# Patient Record
Sex: MALE | Race: BLACK OR AFRICAN AMERICAN | Employment: FULL TIME | URBAN - METROPOLITAN AREA
[De-identification: names, ages, dates, MRNs, and addresses within clinical notes are randomized per-mention and may not be internally consistent; named-entity substitution may affect disease eponyms.]

---

## 2017-05-21 ENCOUNTER — HOSPITAL ENCOUNTER (EMERGENCY)
Age: 42
Discharge: HOME | End: 2017-05-21
Attending: EMERGENCY MEDICINE
Payer: COMMERCIAL

## 2017-05-21 VITALS
HEIGHT: 73 IN | HEART RATE: 63 BPM | BODY MASS INDEX: 28.49 KG/M2 | RESPIRATION RATE: 16 BRPM | DIASTOLIC BLOOD PRESSURE: 93 MMHG | OXYGEN SATURATION: 99 % | WEIGHT: 215 LBS | TEMPERATURE: 97.9 F | SYSTOLIC BLOOD PRESSURE: 135 MMHG

## 2017-05-21 DIAGNOSIS — H01.001 BLEPHARITIS OF RIGHT UPPER EYELID, UNSPECIFIED TYPE: Primary | ICD-10-CM

## 2017-05-21 PROCEDURE — 99283 EMERGENCY DEPT VISIT LOW MDM: CPT

## 2017-05-21 PROCEDURE — 74011000250 HC RX REV CODE- 250: Performed by: PHYSICIAN ASSISTANT

## 2017-05-21 RX ORDER — PROPARACAINE HYDROCHLORIDE 5 MG/ML
1 SOLUTION/ DROPS OPHTHALMIC
Status: COMPLETED | OUTPATIENT
Start: 2017-05-21 | End: 2017-05-21

## 2017-05-21 RX ORDER — ERYTHROMYCIN 5 MG/G
OINTMENT OPHTHALMIC
Qty: 1 G | Refills: 0 | Status: SHIPPED | OUTPATIENT
Start: 2017-05-21 | End: 2017-05-28

## 2017-05-21 RX ADMIN — PROPARACAINE HYDROCHLORIDE 1 DROP: 5 SOLUTION/ DROPS OPHTHALMIC at 18:06

## 2017-05-21 RX ADMIN — FLUORESCEIN SODIUM 1 STRIP: 1 STRIP OPHTHALMIC at 18:06

## 2017-05-21 NOTE — DISCHARGE INSTRUCTIONS
Blepharitis: Care Instructions  Your Care Instructions    Blepharitis is an inflammation or infection of the eyelids. It causes dry, scaly crusts on the eyelids. It can also cause your eyes to itch, burn, and look red. This problem is more common in people who have rosacea, dandruff, skin allergies, or eczema. Home treatment can help you keep your eyes comfortable. Your doctor may also prescribe an ointment to put on your eyelids. Follow-up care is a key part of your treatment and safety. Be sure to make and go to all appointments, and call your doctor if you are having problems. It's also a good idea to know your test results and keep a list of the medicines you take. How can you care for yourself at home? · Wash your eyelids and eyebrows daily with baby shampoo. To wash your eyelids:  ¨ Place a very warm washcloth over your eyes for about a minute. This will help soften and loosen the crusts on your eyelashes. ¨ Put a few drops of baby shampoo on a warm washcloth. ¨ Gently wipe your eyelids. This helps remove any crust. It also cleans your eyelids. ¨ Rinse well with water. · Be safe with medicines. If your doctor prescribed medicine for you, use it exactly as directed. Call your doctor if you think you are having a problem with your medicine. When should you call for help? Call your doctor now or seek immediate medical care if:  · You have new vision problems. · Your eyes hurt. · Your eyelids bleed. Watch closely for changes in your health, and be sure to contact your doctor if:  · After 2 weeks of home treatment, your symptoms are not getting better. · Your eye turns red, gets very teary, or has discharge. Where can you learn more? Go to http://maria e-patric.info/. Enter W620 in the search box to learn more about \"Blepharitis: Care Instructions. \"  Current as of: May 23, 2016  Content Version: 11.2  © 3814-3257 [employer].  Care instructions adapted under license by [address] (which disclaims liability or warranty for this information). If you have questions about a medical condition or this instruction, always ask your healthcare professional. [address] any warranty or liability for your use of this information.

## 2017-05-21 NOTE — ED TRIAGE NOTES
Pt presents to the ED with right eye pain onset yesterday. Pt denies foreign body. Pt reports increasing right eye pain. Pt with noted small swelling to the upper lateral right eyelid. Pt denies visual disturbance, states painful to blink.

## 2017-05-21 NOTE — ED PROVIDER NOTES
Patient is a 38 y.o. male presenting with eye pain. The history is provided by the patient. Eye Pain    This is a new problem. The current episode started yesterday. The problem occurs constantly. The problem has been gradually worsening. The right eye is affected. The injury mechanism is unknown (Pt works in shipyard but wears safety goggles, no known FB or injury). The pain is at a severity of 8/10. There is no history of trauma to the eye. There is no known exposure to pink eye. He does not wear contacts. Associated symptoms include foreign body sensation and pain. Pertinent negatives include no numbness, no blurred vision, no decreased vision, no discharge, no double vision, no photophobia, no eye redness, no nausea, no vomiting, no tingling, no weakness, no itching, no fever, no blindness, no head injury and no dizziness. He has tried nothing for the symptoms. History reviewed. No pertinent past medical history. History reviewed. No pertinent surgical history. History reviewed. No pertinent family history. Social History     Social History    Marital status: SINGLE     Spouse name: N/A    Number of children: N/A    Years of education: N/A     Occupational History    Not on file. Social History Main Topics    Smoking status: Former Smoker    Smokeless tobacco: Not on file    Alcohol use Yes      Comment: Occasionally    Drug use: No    Sexual activity: Not on file     Other Topics Concern    Not on file     Social History Narrative         ALLERGIES: Shellfish derived    Review of Systems   Constitutional: Negative for chills and fever. HENT: Negative for ear pain, rhinorrhea and sore throat. Eyes: Positive for pain. Negative for blindness, blurred vision, double vision, photophobia, discharge, redness, itching and visual disturbance. Respiratory: Negative for cough and shortness of breath. Cardiovascular: Negative for chest pain.    Gastrointestinal: Negative for abdominal pain, constipation, diarrhea, nausea and vomiting. Genitourinary: Negative for dysuria. Musculoskeletal: Negative for neck pain and neck stiffness. Skin: Negative. Negative for itching. Neurological: Negative for dizziness, tingling, weakness, light-headedness, numbness and headaches. Psychiatric/Behavioral: Negative. Vitals:    05/21/17 1754   BP: (!) 135/93   Pulse: 63   Resp: 16   Temp: 97.9 °F (36.6 °C)   SpO2: 99%   Weight: 97.5 kg (215 lb)   Height: 6' 1\" (1.854 m)            Physical Exam   Constitutional: He is oriented to person, place, and time. He appears well-developed and well-nourished. No distress. HENT:   Head: Normocephalic and atraumatic. Right Ear: Tympanic membrane, external ear and ear canal normal.   Left Ear: Tympanic membrane, external ear and ear canal normal.   Nose: Nose normal.   Mouth/Throat: Oropharynx is clear and moist and mucous membranes are normal.   Eyes: Conjunctivae and EOM are normal. Pupils are equal, round, and reactive to light. Lids are everted and swept, no foreign bodies found. Right eye exhibits discharge. Right eye exhibits no chemosis, no exudate and no hordeolum. No foreign body present in the right eye. Left eye exhibits no chemosis, no discharge, no exudate and no hordeolum. No foreign body present in the left eye. Right conjunctiva is not injected. Right conjunctiva has no hemorrhage. Left conjunctiva is not injected. Left conjunctiva has no hemorrhage. Right upper eyelid erythematous, mildly swollen. (-) periorbital pain, conjunctival erythema, crepitus or pain with eye movement. (-) globe pain (-) anatomical abnormality noted (-) visual files deficit (-) FB, No fluorescein uptake. Neck: Normal range of motion. Cardiovascular: Normal rate, regular rhythm, normal heart sounds and intact distal pulses. Exam reveals no gallop and no friction rub. No murmur heard.   Pulmonary/Chest: Effort normal and breath sounds normal. No respiratory distress. He has no wheezes. He has no rales. Abdominal: Soft. Bowel sounds are normal. There is no tenderness. Musculoskeletal: Normal range of motion. Neurological: He is alert and oriented to person, place, and time. Skin: Skin is warm and dry. He is not diaphoretic. Psychiatric: He has a normal mood and affect. His behavior is normal. Judgment and thought content normal.   Nursing note and vitals reviewed. MDM  Number of Diagnoses or Management Options  Diagnosis management comments: DDx: conjunctivitis, hordeolum/stye, blepharitis, corneal abrasion, subcoj hemorrhage, herpes keratitis, eye injury, FB in eye, glaucoma, macular degeneration, periorbital/orbital cellulitis, eye injury, HA, migraine, sinusitis, meningitis, SAH, head injury, TIA, stroke, change in visual acuity, viral illness, scalp/skin etiology, malignancy     IMPRESSION AND MEDICAL DECISION MAKING:  Based upon the patient's presentation with noted HPI and PE, along with the work up done in the emergency department, I believe that the patient is having noted blepharitis. SPECIFIC PATIENT INSTRUCTIONS FROM THE PROVIDER WHO TREATED YOU IN THE ER TODAY:  Erythromycin eye ointment as prescribed. Apply half-inch ribbon to the inside of the lower eyelid of the affected eye 3-4 times a day for 7 days. Apply warm compresses to the eye 4 times a day for 5-10 minutes. Take Tylenol/Acetaminophen (every 4-6 hours) and/or Motrin/Ibuprofen/Advil (every 6-8 hours) or Naprosyn/Naproxyn/Aleve for fever or pain as needed. FOLLOW UP APPOINTMENT:  Your primary care provider or ophthalmologist or the one listed in these discharge instructions in the next week.     Return if any concerns or worsening of condition(s)         Amount and/or Complexity of Data Reviewed  Clinical lab tests: ordered and reviewed  Tests in the medicine section of CPT®: ordered and reviewed  Review and summarize past medical records: yes  Discuss the patient with other providers: yes  Independent visualization of images, tracings, or specimens: yes    Risk of Complications, Morbidity, and/or Mortality  Presenting problems: low  Diagnostic procedures: low  Management options: low    Patient Progress  Patient progress: stable    ED Course       Eye Stain    Date/Time: 5/21/2017 6:22 PM    Performed by: PA  Supervising provider: Dr. Carmencita Klein        Corneal abrasion was not present on eyelid eversion. Cornea is clear. Anterior chamber is clear. Patient tolerance: Patient tolerated the procedure well with no immediate complications  My total time at bedside, performing this procedure was 1-15 minutes. Diagnosis:   1. Blepharitis of right upper eyelid, unspecified type          Disposition: home    Follow-up Information     Follow up With Details Comments Contact Info    [facility] EMERGENCY DEPT  As needed, If symptoms worsen [address] [place] [address] [place] [address]    [address] Go in 2 days  [address] [facility]  [phone]          Patient's Medications   Start Taking    ERYTHROMYCIN (ILOTYCIN) OPHTHALMIC OINTMENT    Apply half-inch ribbon to the inside of the lower eyelid of the affected eye 3-4 times a day for 7 days.    Continue Taking    No medications on file   These Medications have changed    No medications on file   Stop Taking    No medications on file

## 2018-02-22 ENCOUNTER — HOSPITAL ENCOUNTER (EMERGENCY)
Age: 43
Discharge: HOME | End: 2018-02-22
Attending: EMERGENCY MEDICINE
Payer: COMMERCIAL

## 2018-02-22 VITALS
SYSTOLIC BLOOD PRESSURE: 138 MMHG | HEIGHT: 70 IN | DIASTOLIC BLOOD PRESSURE: 89 MMHG | HEART RATE: 84 BPM | WEIGHT: 210 LBS | RESPIRATION RATE: 20 BRPM | OXYGEN SATURATION: 97 % | TEMPERATURE: 98.4 F | BODY MASS INDEX: 30.06 KG/M2

## 2018-02-22 DIAGNOSIS — E11.9 DIABETES MELLITUS, NEW ONSET (HCC): Primary | ICD-10-CM

## 2018-02-22 LAB
ALBUMIN SERPL-MCNC: 4.4 G/DL (ref 3.4–5)
ALBUMIN/GLOB SERPL: 0.8 {RATIO} (ref 0.8–1.7)
ALP SERPL-CCNC: 132 U/L (ref 45–117)
ALT SERPL-CCNC: 25 U/L (ref 16–61)
ANION GAP SERPL CALC-SCNC: 10 MMOL/L (ref 3–18)
AST SERPL-CCNC: 14 U/L (ref 15–37)
BASOPHILS # BLD: 0 K/UL (ref 0–0.06)
BASOPHILS NFR BLD: 0 % (ref 0–2)
BILIRUB SERPL-MCNC: 0.6 MG/DL (ref 0.2–1)
BUN SERPL-MCNC: 14 MG/DL (ref 7–18)
BUN/CREAT SERPL: 10 (ref 12–20)
CALCIUM SERPL-MCNC: 10.3 MG/DL (ref 8.5–10.1)
CHLORIDE SERPL-SCNC: 92 MMOL/L (ref 100–108)
CO2 SERPL-SCNC: 30 MMOL/L (ref 21–32)
CREAT SERPL-MCNC: 1.43 MG/DL (ref 0.6–1.3)
DIFFERENTIAL METHOD BLD: NORMAL
EOSINOPHIL # BLD: 0.1 K/UL (ref 0–0.4)
EOSINOPHIL NFR BLD: 1 % (ref 0–5)
ERYTHROCYTE [DISTWIDTH] IN BLOOD BY AUTOMATED COUNT: 13.5 % (ref 11.6–14.5)
GLOBULIN SER CALC-MCNC: 5.4 G/DL (ref 2–4)
GLUCOSE BLD STRIP.AUTO-MCNC: 254 MG/DL (ref 70–110)
GLUCOSE BLD STRIP.AUTO-MCNC: 455 MG/DL (ref 70–110)
GLUCOSE SERPL-MCNC: 454 MG/DL (ref 74–99)
HCT VFR BLD AUTO: 42.9 % (ref 36–48)
HGB BLD-MCNC: 14.4 G/DL (ref 13–16)
LIPASE SERPL-CCNC: 89 U/L (ref 73–393)
LYMPHOCYTES # BLD: 1.8 K/UL (ref 0.9–3.6)
LYMPHOCYTES NFR BLD: 22 % (ref 21–52)
MCH RBC QN AUTO: 26.7 PG (ref 24–34)
MCHC RBC AUTO-ENTMCNC: 33.6 G/DL (ref 31–37)
MCV RBC AUTO: 79.4 FL (ref 74–97)
MONOCYTES # BLD: 0.7 K/UL (ref 0.05–1.2)
MONOCYTES NFR BLD: 8 % (ref 3–10)
NEUTS SEG # BLD: 5.8 K/UL (ref 1.8–8)
NEUTS SEG NFR BLD: 69 % (ref 40–73)
PLATELET # BLD AUTO: 216 K/UL (ref 135–420)
PMV BLD AUTO: 10.6 FL (ref 9.2–11.8)
POTASSIUM SERPL-SCNC: 4.4 MMOL/L (ref 3.5–5.5)
PROT SERPL-MCNC: 9.8 G/DL (ref 6.4–8.2)
RBC # BLD AUTO: 5.4 M/UL (ref 4.7–5.5)
SODIUM SERPL-SCNC: 132 MMOL/L (ref 136–145)
WBC # BLD AUTO: 8.4 K/UL (ref 4.6–13.2)

## 2018-02-22 PROCEDURE — 83690 ASSAY OF LIPASE: CPT | Performed by: EMERGENCY MEDICINE

## 2018-02-22 PROCEDURE — 96361 HYDRATE IV INFUSION ADD-ON: CPT

## 2018-02-22 PROCEDURE — 96374 THER/PROPH/DIAG INJ IV PUSH: CPT

## 2018-02-22 PROCEDURE — 99284 EMERGENCY DEPT VISIT MOD MDM: CPT

## 2018-02-22 PROCEDURE — 74011250636 HC RX REV CODE- 250/636: Performed by: EMERGENCY MEDICINE

## 2018-02-22 PROCEDURE — 74011636637 HC RX REV CODE- 636/637: Performed by: EMERGENCY MEDICINE

## 2018-02-22 PROCEDURE — 85025 COMPLETE CBC W/AUTO DIFF WBC: CPT | Performed by: EMERGENCY MEDICINE

## 2018-02-22 PROCEDURE — 82962 GLUCOSE BLOOD TEST: CPT

## 2018-02-22 PROCEDURE — 80053 COMPREHEN METABOLIC PANEL: CPT | Performed by: EMERGENCY MEDICINE

## 2018-02-22 RX ORDER — METFORMIN HYDROCHLORIDE 500 MG/1
500 TABLET ORAL 2 TIMES DAILY WITH MEALS
Qty: 60 TAB | Refills: 0 | Status: SHIPPED | OUTPATIENT
Start: 2018-02-22 | End: 2019-11-13 | Stop reason: SDUPTHER

## 2018-02-22 RX ADMIN — INSULIN HUMAN 12 UNITS: 100 INJECTION, SOLUTION PARENTERAL at 12:14

## 2018-02-22 RX ADMIN — SODIUM CHLORIDE 1000 ML: 900 INJECTION, SOLUTION INTRAVENOUS at 11:26

## 2018-02-22 NOTE — ED NOTES
Almeta Goodpasture is a 41 y.o. male that was discharged in stable condition. The patients diagnosis, condition and treatment were explained to  patient and aftercare instructions were given. The patient verbalized understanding. Patient armband removed and shredded.

## 2018-02-22 NOTE — ED PROVIDER NOTES
HPI Comments: 11:54 AM Olivia Ely is a 41 y.o. male with a h/o Smoking presents to the ED with c/o Hyperglycemia onset today. Pt stated he was never told that he had DM, and that he has been drinking a lot of water because he has been feeling dehydrated. Pt reported urinary frequency onset 1 month ago. Pt denied n/v/d, CP, SOB, fever, numbness, weakness, or cough. All other sx denied. No other complaints at this time. PCP-None      Patient is a 41 y.o. male presenting with hyperglycemia. High Blood Sugar    Associated symptoms include frequency. Pertinent negatives include no fever, no nausea, no chest pain and no back pain. History reviewed. No pertinent past medical history. History reviewed. No pertinent surgical history. History reviewed. No pertinent family history. Social History     Social History    Marital status: SINGLE     Spouse name: N/A    Number of children: N/A    Years of education: N/A     Occupational History    Not on file. Social History Main Topics    Smoking status: Former Smoker    Smokeless tobacco: Never Used    Alcohol use Yes      Comment: Occasionally    Drug use: No    Sexual activity: Not on file     Other Topics Concern    Not on file     Social History Narrative         ALLERGIES: Shellfish derived    Review of Systems   Constitutional: Negative for diaphoresis and fever. HENT: Negative for congestion. Respiratory: Negative for cough and shortness of breath. Cardiovascular: Negative for chest pain. Gastrointestinal: Negative for abdominal pain and nausea. Genitourinary: Positive for frequency. Negative for enuresis. Musculoskeletal: Negative for back pain. Skin: Negative for rash. Neurological: Negative for dizziness. All other systems reviewed and are negative.       Vitals:    02/22/18 1107 02/22/18 1230 02/22/18 1330   BP: (!) 135/97 141/86 138/89   Pulse: 84     Resp: 20     Temp: 98.4 °F (36.9 °C)     SpO2: 98% 98% 97%   Weight: 95.3 kg (210 lb)     Height: 5' 10\" (1.778 m)              Physical Exam   Constitutional: He is oriented to person, place, and time. He appears well-developed and well-nourished. No distress. HENT:   Head: Normocephalic and atraumatic. Eyes: No scleral icterus. Neck: No JVD present. Cardiovascular: Normal rate and regular rhythm. Pulmonary/Chest: Effort normal and breath sounds normal. No respiratory distress. Abdominal: Soft. There is no tenderness. Neurological: He is alert and oriented to person, place, and time. Skin: Skin is warm and dry. Psychiatric: He has a normal mood and affect. Nursing note and vitals reviewed. MDM  Number of Diagnoses or Management Options  Diabetes mellitus, new onset [facility]):   Diagnosis management comments: IMP: New onset DM w/ sxs of polyuria and polydipsia for a month. No PCP. Not in DKA. Glucose improved to 250 range after insulin and IVF. Long d/w pt re: diabetes education. Consult placed to diabetes educator and referral for f/u given. Will start on Metformin. Pt knows this will likely be inadequate to control blood sugar and that he may require insulin.          ED Course       Procedures    Vitals:  Patient Vitals for the past 12 hrs:   Temp Pulse Resp BP SpO2   02/22/18 1330 - - - 138/89 97 %   02/22/18 1230 - - - 141/86 98 %   02/22/18 1107 98.4 °F (36.9 °C) 84 20 (!) 135/97 98 %         Medications ordered:   Medications   sodium chloride 0.9 % bolus infusion 1,000 mL (0 mL IntraVENous IV Completed 2/22/18 1343)   insulin regular (NOVOLIN R, HUMULIN R) injection 12 Units (12 Units IntraVENous Given 2/22/18 1214)         Lab findings:  Recent Results (from the past 12 hour(s))   GLUCOSE, POC    Collection Time: 02/22/18 11:09 AM   Result Value Ref Range    Glucose (POC) 455 (HH) 70 - 110 mg/dL   CBC WITH AUTOMATED DIFF    Collection Time: 02/22/18 11:15 AM   Result Value Ref Range    WBC 8.4 4.6 - 13.2 K/uL    RBC 5.40 4.70 - 5.50 M/uL HGB 14.4 13.0 - 16.0 g/dL    HCT 42.9 36.0 - 48.0 %    MCV 79.4 74.0 - 97.0 FL    MCH 26.7 24.0 - 34.0 PG    MCHC 33.6 31.0 - 37.0 g/dL    RDW 13.5 11.6 - 14.5 %    PLATELET 823 678 - 295 K/uL    MPV 10.6 9.2 - 11.8 FL    NEUTROPHILS 69 40 - 73 %    LYMPHOCYTES 22 21 - 52 %    MONOCYTES 8 3 - 10 %    EOSINOPHILS 1 0 - 5 %    BASOPHILS 0 0 - 2 %    ABS. NEUTROPHILS 5.8 1.8 - 8.0 K/UL    ABS. LYMPHOCYTES 1.8 0.9 - 3.6 K/UL    ABS. MONOCYTES 0.7 0.05 - 1.2 K/UL    ABS. EOSINOPHILS 0.1 0.0 - 0.4 K/UL    ABS. BASOPHILS 0.0 0.0 - 0.06 K/UL    DF AUTOMATED     METABOLIC PANEL, COMPREHENSIVE    Collection Time: 02/22/18 11:15 AM   Result Value Ref Range    Sodium 132 (L) 136 - 145 mmol/L    Potassium 4.4 3.5 - 5.5 mmol/L    Chloride 92 (L) 100 - 108 mmol/L    CO2 30 21 - 32 mmol/L    Anion gap 10 3.0 - 18 mmol/L    Glucose 454 (HH) 74 - 99 mg/dL    BUN 14 7.0 - 18 MG/DL    Creatinine 1.43 (H) 0.6 - 1.3 MG/DL    BUN/Creatinine ratio 10 (L) 12 - 20      GFR est AA >60 >60 ml/min/1.73m2    GFR est non-AA 55 (L) >60 ml/min/1.73m2    Calcium 10.3 (H) 8.5 - 10.1 MG/DL    Bilirubin, total 0.6 0.2 - 1.0 MG/DL    ALT (SGPT) 25 16 - 61 U/L    AST (SGOT) 14 (L) 15 - 37 U/L    Alk. phosphatase 132 (H) 45 - 117 U/L    Protein, total 9.8 (H) 6.4 - 8.2 g/dL    Albumin 4.4 3.4 - 5.0 g/dL    Globulin 5.4 (H) 2.0 - 4.0 g/dL    A-G Ratio 0.8 0.8 - 1.7     LIPASE    Collection Time: 02/22/18 11:15 AM   Result Value Ref Range    Lipase 89 73 - 393 U/L   GLUCOSE, POC    Collection Time: 02/22/18  1:06 PM   Result Value Ref Range    Glucose (POC) 254 (H) 70 - 110 mg/dL           X-Ray, CT or other radiology findings or impressions:  No orders to display       Progress notes, Consult notes or additional Procedure notes:       Disposition:  Diagnosis:   1.  Di[facility]      1) Start Metformin 500 mg twice daily  2) This may not be adequate to control your blood sugar and close follow up is required  3) A consult was placed in the ED to the Diabetes Educator who should be contacting you to help assist with follow up. 4) Follow up with [facility] next available appointment  5) Return for medical emergencies as needed. Disposition: home    Follow-up Information     Follow up With Details Comments [facility] Schedule an appointment as soon as possible for a visit ASAP [place]    [address] [facility] EMERGENCY DEPT  As needed, If symptoms worsen [address]  [phone]           Patient's Medications   Start Taking    METFORMIN (GLUCOPHAGE) 500 MG TABLET    Take 1 Tab by mouth two (2) times daily (with meals). Continue Taking    No medications on file   These Medications have changed    No medications on file   Stop Taking    No medications on file         [facility] acting as a scribe for and in the presence of Sandy Joyner MD      February 22, 2018 at 1:56 PM       Provider Attestation:      I personally performed the services described in the documentation, reviewed the documentation, as recorded by the scribe in my presence, and it accurately and completely records my words and actions.  February 22, 2018 at 1:56 PM - Sandy Joyner MD

## 2018-02-22 NOTE — DISCHARGE INSTRUCTIONS

## 2018-02-22 NOTE — Clinical Note
1) Start Metformin 500 mg twice daily 2) This may not be adequate to control your blood sugar and close follow up.

## 2018-02-22 NOTE — ED NOTES
Pt  Given diabetic  Education  Book and  Information on class schedule, pt and family member present and  Discussed concerns

## 2018-02-22 NOTE — ED TRIAGE NOTES
C/o dry mouth, itchy throat, frequent urination, and eating more in past few weeks. Denies any hx of diabetes.

## 2019-02-05 ENCOUNTER — HOSPITAL ENCOUNTER (EMERGENCY)
Age: 44
Discharge: HOME | End: 2019-02-05
Attending: EMERGENCY MEDICINE
Payer: COMMERCIAL

## 2019-02-05 ENCOUNTER — APPOINTMENT (OUTPATIENT)
Dept: GENERAL RADIOLOGY | Age: 44
End: 2019-02-05
Attending: PHYSICIAN ASSISTANT
Payer: COMMERCIAL

## 2019-02-05 VITALS
OXYGEN SATURATION: 98 % | SYSTOLIC BLOOD PRESSURE: 125 MMHG | RESPIRATION RATE: 19 BRPM | WEIGHT: 216 LBS | HEART RATE: 91 BPM | DIASTOLIC BLOOD PRESSURE: 76 MMHG | TEMPERATURE: 99.2 F | HEIGHT: 71 IN | BODY MASS INDEX: 30.24 KG/M2

## 2019-02-05 DIAGNOSIS — R68.83 CHILLS: ICD-10-CM

## 2019-02-05 DIAGNOSIS — R05.9 COUGH: Primary | ICD-10-CM

## 2019-02-05 DIAGNOSIS — M54.6 ACUTE LEFT-SIDED THORACIC BACK PAIN: ICD-10-CM

## 2019-02-05 PROCEDURE — 99282 EMERGENCY DEPT VISIT SF MDM: CPT

## 2019-02-05 PROCEDURE — 71046 X-RAY EXAM CHEST 2 VIEWS: CPT

## 2019-02-05 RX ORDER — HYDROCODONE POLISTIREX AND CHLORPHENIRAMINE POLISTIREX 10; 8 MG/5ML; MG/5ML
5 SUSPENSION, EXTENDED RELEASE ORAL
Qty: 60 ML | Refills: 0 | Status: SHIPPED | OUTPATIENT
Start: 2019-02-05 | End: 2019-11-13

## 2019-02-05 RX ORDER — AZITHROMYCIN 250 MG/1
TABLET, FILM COATED ORAL
Qty: 6 TAB | Refills: 0 | Status: SHIPPED | OUTPATIENT
Start: 2019-02-05 | End: 2019-02-10

## 2019-02-05 RX ORDER — BENZONATATE 100 MG/1
100 CAPSULE ORAL
Qty: 30 CAP | Refills: 0 | Status: SHIPPED | OUTPATIENT
Start: 2019-02-05 | End: 2019-02-12

## 2019-02-05 RX ORDER — IBUPROFEN 600 MG/1
600 TABLET ORAL
Qty: 20 TAB | Refills: 0 | Status: SHIPPED | OUTPATIENT
Start: 2019-02-05 | End: 2019-11-13

## 2019-02-05 RX ORDER — ACETAMINOPHEN 325 MG/1
650 TABLET ORAL
Qty: 20 TAB | Refills: 0 | Status: SHIPPED | OUTPATIENT
Start: 2019-02-05 | End: 2019-11-13

## 2019-02-05 NOTE — LETTER
[address] [place] [address] [facility] EMER[facility] 
[address] [facility] [address] [phone] Work/School Note Date: 2/5/2019 To Whom It May concern: 
 
Taisha Muller was seen and treated today in the emergency room by the following provider(s): 
Attending Provider: Marlin Garibay MD 
Physician Assistant: Birdie Laird. Taisha Muller may return to work on 2/7/19 Sincerely, Birdie Wilkinson

## 2019-02-05 NOTE — ED TRIAGE NOTES
Productive cough with left lower back pain x 2 days. States when he starts coughing he gets \"really short of breath\" and his back \"feels like something is pulling\"

## 2019-02-06 NOTE — ED NOTES
7:34 PM 
02/05/19 Discharge instructions given to [facility] (name) with verbalization of understanding. Patient accompanied by spouse. Patient discharged with the following prescriptions tylenol, motrin, azithromycin, tessalon perles, tussionex. Patient discharged to home (destination). Sanam Kendall

## 2019-02-06 NOTE — DISCHARGE INSTRUCTIONS
Patient Education        Learning About Relief for Back Pain  What is back tension and strain? Back strain happens when you overstretch, or pull, a muscle in your back. You may hurt your back in an accident or when you exercise or lift something. Most back pain will get better with rest and time. You can take care of yourself at home to help your back heal.  What can you do first to relieve back pain? When you first feel back pain, try these steps:  · Walk. Take a short walk (10 to 20 minutes) on a level surface (no slopes, hills, or stairs) every 2 to 3 hours. Walk only distances you can manage without pain, especially leg pain. · Relax. Find a comfortable position for rest. Some people are comfortable on the floor or a medium-firm bed with a small pillow under their head and another under their knees. Some people prefer to lie on their side with a pillow between their knees. Don't stay in one position for too long. · Try heat or ice. Try using a heating pad on a low or medium setting, or take a warm shower, for 15 to 20 minutes every 2 to 3 hours. Or you can buy single-use heat wraps that last up to 8 hours. You can also try an ice pack for 10 to 15 minutes every 2 to 3 hours. You can use an ice pack or a bag of frozen vegetables wrapped in a thin towel. There is not strong evidence that either heat or ice will help, but you can try them to see if they help. You may also want to try switching between heat and cold. · Take pain medicine exactly as directed. ? If the doctor gave you a prescription medicine for pain, take it as prescribed. ? If you are not taking a prescription pain medicine, ask your doctor if you can take an over-the-counter medicine. What else can you do? · Stretch and exercise. Exercises that increase flexibility may relieve your pain and make it easier for your muscles to keep your spine in a good, neutral position. And don't forget to keep walking. · Do self-massage.  You can use self-massage to unwind after work or school or to energize yourself in the morning. You can easily massage your feet, hands, or neck. Self-massage works best if you are in comfortable clothes and are sitting or lying in a comfortable position. Use oil or lotion to massage bare skin. · Reduce stress. Back pain can lead to a vicious [place]: Distress about the pain tenses the muscles in your back, which in turn causes more pain. Learn how to relax your mind and your muscles to lower your stress. Where can you learn more? Go to http://maria eEat In Chefpatric.info/. Enter P749 in the search box to learn more about \"Learning About Relief for Back Pain. \"  Current as of: September 20, 2018  Content Version: 11.9  © 8037-7382 [employer]. Care instructions adapted under license by [employer] (which disclaims liability or warranty for this information). If you have questions about a medical condition or this instruction, always ask your healthcare professional. [place] [address] any warranty or liability for your use of this information. Patient Education        Cough: Care Instructions  Your Care Instructions    A cough is your body's response to something that bothers your throat or airways. Many things can cause a cough. You might cough because of a cold or the flu, bronchitis, or asthma. Smoking, postnasal drip, allergies, and stomach acid that backs up into your throat also can cause coughs. A cough is a symptom, not a disease. Most coughs stop when the cause, such as a cold, goes away. You can take a few steps at home to cough less and feel better. Follow-up care is a key part of your treatment and safety. Be sure to make and go to all appointments, and call your doctor if you are having problems. It's also a good idea to know your test results and keep a list of the medicines you take. How can you care for yourself at home?   · Drink lots of water and other fluids. This helps thin the mucus and soothes a dry or sore throat. Honey or lemon juice in hot water or tea may ease a dry cough. · Take cough medicine as directed by your doctor. · Prop up your head on pillows to help you breathe and ease a dry cough. · Try cough drops to soothe a dry or sore throat. Cough drops don't stop a cough. Medicine-flavored cough drops are no better than candy-flavored drops or hard candy. · Do not smoke. Avoid secondhand smoke. If you need help quitting, talk to your doctor about stop-smoking programs and medicines. These can increase your chances of quitting for good. When should you call for help? Call 911 anytime you think you may need emergency care. For example, call if:    · You have severe trouble breathing.    Call your doctor now or seek immediate medical care if:    · You cough up blood.     · You have new or worse trouble breathing.     · You have a new or higher fever.     · You have a new rash.    Watch closely for changes in your health, and be sure to contact your doctor if:    · You cough more deeply or more often, especially if you notice more mucus or a change in the color of your mucus.     · You have new symptoms, such as a sore throat, an earache, or sinus pain.     · You do not get better as expected. Where can you learn more? Go to http://maria e-patric.info/. Enter D279 in the search box to learn more about \"Cough: Care Instructions. \"  Current as of: September 5, 2018  Content Version: 11.9  © 0380-2271 [employer]. Care instructions adapted under license by [employer] (which disclaims liability or warranty for this information). If you have questions about a medical condition or this instruction, always ask your healthcare professional. [place] [address] any warranty or liability for your use of this information.

## 2019-02-06 NOTE — ED PROVIDER NOTES
EMERGENCY DEPARTMENT HISTORY AND PHYSICAL EXAM 
 
Date: 2/5/2019 Patient Name: Ede Lerma History of Presenting Illness Chief Complaint Patient presents with  Cough  Back Pain History Provided By: Patient Chief Complaint: Cough, back pain, chills Duration: 3 days Timing:  Gradual 
Location: Left upper back Quality: Dull Severity: 10 out of 10 Modifying Factors: Cough makes back pain worse Associated Symptoms: none Additional History (Context): Ede Lerma is a 41 y.o. male with a history of asthma and DM who presents with a 3 day history of left sided back pain, cough and chills. Reports he has been around a loved one with the same symptoms and \"It jumped onto me\". Reports he was at work and had to leave due to coughing. Has tried NSAIDS at home with relief. Reports no fevers, smoking and states he was never officially diagnosed with diabetes and has not had to use an inhaler in years. Denies myalgias, CP, pleurisy, n/v/c/d, sore throat, ear pain. Reports shortness of breath when \"I start to cough to much\", but states it resolved when the coughing resolved. PCP: None Current Outpatient Medications Medication Sig Dispense Refill  benzonatate (TESSALON PERLES) 100 mg capsule Take 1 Cap by mouth three (3) times daily as needed for Cough for up to 7 days. 30 Cap 0  chlorpheniramine-HYDROcodone (TUSSIONEX PENNKINETIC ER) 10-8 mg/5 mL suspension Take 5 mL by mouth every twelve (12) hours as needed for Cough. Max Daily Amount: 10 mL. 60 mL 0  
 azithromycin (ZITHROMAX Z-VICKI) 250 mg tablet Take two tablets by mouth on day one and then one tablet by mouth on days 2-5 6 Tab 0  ibuprofen (MOTRIN) 600 mg tablet Take 1 Tab by mouth every six (6) hours as needed for Pain. 20 Tab 0  
 acetaminophen (TYLENOL) 325 mg tablet Take 2 Tabs by mouth every four (4) hours as needed for Pain.  20 Tab 0  
  metFORMIN (GLUCOPHAGE) 500 mg tablet Take 1 Tab by mouth two (2) times daily (with meals). 60 Tab 0 Past History Past Medical History: 
Past Medical History:  
Diagnosis Date  Asthma  Diabetes ([address] Past Surgical History: 
History reviewed. No pertinent surgical history. Family History: 
History reviewed. No pertinent family history. Social History: 
Social History Tobacco Use  Smoking status: Former Smoker  Smokeless tobacco: Never Used Substance Use Topics  Alcohol use: Yes Comment: Occasionally  Drug use: No  
 
 
Allergies: Allergies Allergen Reactions  Shellfish Derived Hives Review of Systems Review of Systems Constitutional: Positive for chills. Negative for fever. HENT: Negative for congestion, rhinorrhea and sore throat. Respiratory: Positive for cough and shortness of breath. Cardiovascular: Negative for chest pain. Gastrointestinal: Negative for abdominal pain, blood in stool, constipation, diarrhea, nausea and vomiting. Genitourinary: Negative for dysuria, frequency and hematuria. Musculoskeletal: Negative for back pain and myalgias. Skin: Negative for rash and wound. Neurological: Negative for dizziness and headaches. All other systems reviewed and are negative. All Other Systems Negative Physical Exam  
 
Vitals:  
 02/05/19 1748 BP: 125/76 Pulse: 91  
Resp: 19 Temp: 99.2 °F (37.3 °C) SpO2: 98% Weight: 98 kg (216 lb) Height: 5' 11\" (1.803 m) Physical Exam  
Constitutional: He is oriented to person, place, and time. He appears well-developed and well-nourished. No distress. HENT:  
Head: Normocephalic and atraumatic.   
Right Ear: Tympanic membrane and ear canal normal.  
Left Ear: Tympanic membrane and ear canal normal.  
Nose: Nose normal.  
Mouth/Throat: Uvula is midline, oropharynx is clear and moist and mucous membranes are normal.  
Eyes: Conjunctivae are normal.  
 Neck: Normal range of motion. Neck supple. Cardiovascular: Normal rate, regular rhythm and normal heart sounds. Pulmonary/Chest: Effort normal and breath sounds normal. No respiratory distress. He has no decreased breath sounds. He has no wheezes. He has no rhonchi. He has no rales. He exhibits no tenderness. Abdominal: Soft. Bowel sounds are normal. He exhibits no distension. There is no tenderness. There is no rebound and no guarding. Musculoskeletal: Normal range of motion. He exhibits no edema or deformity. Neurological: He is alert and oriented to person, place, and time. Skin: Skin is warm and dry. He is not diaphoretic. Psychiatric: He has a normal mood and affect. His behavior is normal.  
Nursing note and vitals reviewed. Diagnostic Study Results Labs - No results found for this or any previous visit (from the past 12 hour(s)). Radiologic Studies -  
XR CHEST PA LAT    (Results Pending) CT Results  (Last 48 hours) None CXR Results  (Last 48 hours) None Medical Decision Making I am the first provider for this patient. I reviewed the vital signs, available nursing notes, past medical history, past surgical history, family history and social history. Vital Signs-Reviewed the patient's vital signs. Pulse Oximetry Analysis -  100 % RA Records Reviewed: Nursing Notes and Old Medical Records Procedures: None Procedures Provider Notes (Medical Decision Making):  
 
Differential Diagnosis:  influenza, mononucleosis, acute bronchitis, URI, streptococcal pharyngitis, pertussis, pneumonia, asthma exacerbation, allergic rhinitis Plan: will order chest xray. 7:27 PM 
Have shared reassuring chest xray with patient, will discharge home on short course of abx given patient concern, antitussives, and motrin and tylenol. Advised rest, hydration, and PCP follow up.  Patient agrees with the plan and management and states all questions have been thoroughly answered and there are no more remaining questions. MED RECONCILIATION: 
No current facility-administered medications for this encounter. Current Outpatient Medications Medication Sig  
 benzonatate (TESSALON PERLES) 100 mg capsule Take 1 Cap by mouth three (3) times daily as needed for Cough for up to 7 days.  chlorpheniramine-HYDROcodone (TUSSIONEX PENNKINETIC ER) 10-8 mg/5 mL suspension Take 5 mL by mouth every twelve (12) hours as needed for Cough. Max Daily Amount: 10 mL.  azithromycin (ZITHROMAX Z-VICKI) 250 mg tablet Take two tablets by mouth on day one and then one tablet by mouth on days 2-5  ibuprofen (MOTRIN) 600 mg tablet Take 1 Tab by mouth every six (6) hours as needed for Pain.  acetaminophen (TYLENOL) 325 mg tablet Take 2 Tabs by mouth every four (4) hours as needed for Pain.  metFORMIN (GLUCOPHAGE) 500 mg tablet Take 1 Tab by mouth two (2) times daily (with meals). Disposition: 
Home DISCHARGE NOTE:  
Pt has been reexamined. Patient has no new complaints, changes, or physical findings. Care plan outlined and precautions discussed. Results of workup were reviewed with the patient. All medications were reviewed with the patient. All of pt's questions and concerns were addressed. Patient was instructed and agrees to follow up with PCP, as well as to return to the ED upon further deterioration. Patient is ready to go home. Follow-up Information Follow up With Specialties Details Why Contact Info [id number] [facility] EMERGENCY DEPT Emergency Medicine   Zechariah Padilla [id number] [phone] [address]  In 2 days  [address] [place] [address] [address] [place] [address] 
[phone] Current Discharge Medication List  
  
START taking these medications  Details  
benzonatate (TESSALON PERLES) 100 mg capsule Take 1 Cap by mouth three (3) times daily as needed for Cough for up to 7 days. Qty: 30 Cap, Refills: 0 Associated Diagnoses: Cough  
  
chlorpheniramine-HYDROcodone (TUSSIONEX PENNKINETIC ER) 10-8 mg/5 mL suspension Take 5 mL by mouth every twelve (12) hours as needed for Cough. Max Daily Amount: 10 mL. Qty: 60 mL, Refills: 0 Associated Diagnoses: Cough  
  
azithromycin (ZITHROMAX Z-VICKI) 250 mg tablet Take two tablets by mouth on day one and then one tablet by mouth on days 2-5 Qty: 6 Tab, Refills: 0  
  
ibuprofen (MOTRIN) 600 mg tablet Take 1 Tab by mouth every six (6) hours as needed for Pain. Qty: 20 Tab, Refills: 0  
  
acetaminophen (TYLENOL) 325 mg tablet Take 2 Tabs by mouth every four (4) hours as needed for Pain. Qty: 20 Tab, Refills: 0 Diagnosis Clinical Impression: 1. Cough 2. Chills 3. Acute left-sided thoracic back pain

## 2019-11-13 ENCOUNTER — OFFICE VISIT (OUTPATIENT)
Dept: FAMILY MEDICINE CLINIC | Age: 44
End: 2019-11-13

## 2019-11-13 VITALS
BODY MASS INDEX: 28 KG/M2 | OXYGEN SATURATION: 98 % | WEIGHT: 200 LBS | DIASTOLIC BLOOD PRESSURE: 88 MMHG | TEMPERATURE: 97.6 F | HEIGHT: 71 IN | HEART RATE: 68 BPM | SYSTOLIC BLOOD PRESSURE: 120 MMHG | RESPIRATION RATE: 18 BRPM

## 2019-11-13 DIAGNOSIS — Z13.220 ENCOUNTER FOR LIPID SCREENING FOR CARDIOVASCULAR DISEASE: ICD-10-CM

## 2019-11-13 DIAGNOSIS — Z13.29 SCREENING FOR THYROID DISORDER: ICD-10-CM

## 2019-11-13 DIAGNOSIS — E11.65 TYPE 2 DIABETES MELLITUS WITH HYPERGLYCEMIA, WITHOUT LONG-TERM CURRENT USE OF INSULIN (HCC): ICD-10-CM

## 2019-11-13 DIAGNOSIS — Z13.6 ENCOUNTER FOR LIPID SCREENING FOR CARDIOVASCULAR DISEASE: ICD-10-CM

## 2019-11-13 DIAGNOSIS — E11.65 TYPE 2 DIABETES MELLITUS WITH HYPERGLYCEMIA, WITHOUT LONG-TERM CURRENT USE OF INSULIN (HCC): Primary | ICD-10-CM

## 2019-11-13 DIAGNOSIS — R73.09 SUGAR BLOOD LEVEL INCREASED: ICD-10-CM

## 2019-11-13 DIAGNOSIS — E66.3 OVERWEIGHT (BMI 25.0-29.9): ICD-10-CM

## 2019-11-13 PROBLEM — E11.9 DIABETES (HCC): Status: ACTIVE | Noted: 2019-11-13

## 2019-11-13 LAB — HBA1C MFR BLD HPLC: 7.7 %

## 2019-11-13 RX ORDER — METFORMIN HYDROCHLORIDE 500 MG/1
500 TABLET ORAL 2 TIMES DAILY WITH MEALS
Qty: 60 TAB | Refills: 0 | Status: SHIPPED | OUTPATIENT
Start: 2019-11-13 | End: 2021-05-05

## 2019-11-13 NOTE — PROGRESS NOTES
Veena Nichols presents today for   Chief Complaint   Patient presents with    Diabetes     Wants to get DM under control. Needed PCP  close to home       Is someone accompanying this pt? No    Is the patient using any DME equipment during OV? No    Depression Screening:  3 most recent PHQ Screens 11/13/2019   Little interest or pleasure in doing things Not at all   Feeling down, depressed, irritable, or hopeless Not at all   Total Score PHQ 2 0       Learning Assessment:  No flowsheet data found. Abuse Screening:  Abuse Screening Questionnaire 11/13/2019   Do you ever feel afraid of your partner? N   Are you in a relationship with someone who physically or mentally threatens you? N   Is it safe for you to go home? Y         Health Maintenance Due   Topic Date Due    DTaP/Tdap/Td series (1 - Tdap) 02/08/1998    Influenza Age 5 to Adult  08/01/2019   . Health Maintenance reviewed and discussed and ordered per Provider. Coordination of Care  1. Have you been to the ER, urgent care clinic since your last visit? Hospitalized since your last visit? No    2. Have you seen or consulted any other health care providers outside of the [address] [place] [address] since your last visit? Include any pap smears or colon screening. No      Advance Directive:  1. Do you have an advance directive in place? Patient Reply:No    2. If not, would you like material regarding how to put one in place?  Patient Reply: No

## 2019-11-13 NOTE — PROGRESS NOTES
HPI  Pt presents to establish care. Patient with a history of type 2 diabetes. Had been taking Metformin but stopped several months ago. Says that at times he has  blurred vision and sometimes he feels like he has to urinate more than he should. Said that symptoms used to be more severe but he lost weight and now this is not as severe. Aside from this is feeling well    Lizz Joyner that he needs to get [employer] paperwork filled out because he doesn't always feel up to going to work and feels that is related to his diabetes being out of control, and he is already in trouble for missing too many days. Past Medical History  Past Medical History:   Diagnosis Date    Asthma     Diabetes [facility])        Surgical History  No past surgical history on file. Medications  Current Outpatient Medications   Medication Sig Dispense Refill    metFORMIN (GLUCOPHAGE) 500 mg tablet Take 1 Tab by mouth two (2) times daily (with meals). 60 Tab 0       Allergies  Allergies   Allergen Reactions    Shellfish Derived Hives       Family History  No family history on file.     Social History  Social History     Socioeconomic History    Marital status: SINGLE     Spouse name: Not on file    Number of children: Not on file    Years of education: Not on file    Highest education level: Not on file   Occupational History    Not on file   Social Needs    Financial resource strain: Not on file    Food insecurity:     Worry: Not on file     Inability: Not on file    Transportation needs:     Medical: Not on file     Non-medical: Not on file   Tobacco Use    Smoking status: Former Smoker    Smokeless tobacco: Never Used   Substance and Sexual Activity    Alcohol use: Yes     Comment: Occasionally    Drug use: No    Sexual activity: Not on file   Lifestyle    Physical activity:     Days per week: Not on file     Minutes per session: Not on file    Stress: Not on file   Relationships    Social connections:     Talks on phone: Not on file     Gets together: Not on file     Attends Mandaen service: Not on file     Active member of club or organization: Not on file     Attends meetings of clubs or organizations: Not on file     Relationship status: Not on file    Intimate partner violence:     Fear of current or ex partner: Not on file     Emotionally abused: Not on file     Physically abused: Not on file     Forced sexual activity: Not on file   Other Topics Concern    Not on file   Social History Narrative    Not on file       Problem List  Patient Active Problem List   Diagnosis Code    Diabetes ([facility] [address] E11.9    Overweight (BMI 25.0-29. 9) E66.3    Sugar blood level increased R73.09    Encounter for lipid screening for cardiovascular disease Z13.220, Z13.6    Screening for thyroid disorder Z13.29       Review of Systems  Review of Systems   Constitutional: Negative. Eyes: Positive for blurred vision. Intermittently has blurred vision   Respiratory: Negative. Cardiovascular: Negative. Neurological: Positive for headaches. Occasional headaches       Vital Signs  Vitals:    11/13/19 1541   BP: 120/88   Pulse: 68   Resp: 18   Temp: 97.6 °F (36.4 °C)   TempSrc: Oral   SpO2: 98%   Weight: 200 lb (90.7 kg)   Height: 5' 11\" (1.803 m)   PainSc:   0 - No pain       Physical Exam  Physical Exam   Constitutional: He is oriented to person, place, and time. He appears well-developed and well-nourished. No distress. Neck: Normal range of motion. Neck supple. Cardiovascular: Normal rate, regular rhythm and normal heart sounds. Pulmonary/Chest: Effort normal. No respiratory distress. Abdominal: Soft. Bowel sounds are normal. He exhibits no distension. There is no tenderness. Neurological: He is alert and oriented to person, place, and time. Skin: Skin is warm and dry. Psychiatric: He has a normal mood and affect. Nursing note and vitals reviewed.       Diagnostics  Orders Placed This Encounter    CBC WITH AUTOMATED DIFF     Standing Status:   Future     Number of Occurrences:   1     Standing Expiration Date:   11/13/2020    LIPID PANEL     Standing Status:   Future     Number of Occurrences:   1     Standing Expiration Date:   21/11/3904    METABOLIC PANEL, COMPREHENSIVE     Standing Status:   Future     Number of Occurrences:   1     Standing Expiration Date:   11/13/2020    TSH 3RD GENERATION     Standing Status:   Future     Number of Occurrences:   1     Standing Expiration Date:   11/13/2020    MICROALBUMIN, UR, RAND W/ MICROALB/CREAT RATIO     Standing Status:   Future     Number of Occurrences:   1     Standing Expiration Date:   11/13/2020    AMB POC HEMOGLOBIN A1C    metFORMIN (GLUCOPHAGE) 500 mg tablet     Sig: Take 1 Tab by mouth two (2) times daily (with meals). Dispense:  60 Tab     Refill:  0       Results  Results for orders placed or performed in visit on 11/13/19   AMB POC HEMOGLOBIN A1C   Result Value Ref Range    Hemoglobin A1c (POC) 7.7 %     Assessment and Plan  Diagnoses and all orders for this visit:    1. Type 2 diabetes mellitus with hyperglycemia, without long-term current use of insulin (HCC)  -     CBC WITH AUTOMATED DIFF; Future  -     METABOLIC PANEL, COMPREHENSIVE; Future  -     MICROALBUMIN, UR, RAND W/ MICROALB/CREAT RATIO; Future  -     metFORMIN (GLUCOPHAGE) 500 mg tablet; Take 1 Tab by mouth two (2) times daily (with meals). Point-of-care A1c obtained in the office today and is 7.7. Discussed with patient that I would not be able to fill out [facility] paperwork at this time. my primary goal for him is to take his medication and get his diabetes under control so that he feels well enough to go to work. Patient is to restart metformin. Discussed with patient that he should take it once a day with breakfast for the first 2 weeks and then twice daily thereafter and that hopefully this will avoid any potential abdominal upset that metformin can cause.   Reviewed importance of adhering to diabetic diet. Encourage patient to check his blood sugar once a day at least 3 or 4 times a week. Reviewed that he can do this fasting or 2 hours after eating a large meal.    2. Sugar blood level increased  -     AMB POC HEMOGLOBIN A1C    3. Encounter for lipid screening for cardiovascular disease  -     LIPID PANEL; Future    4. Screening for thyroid disorder  -     TSH 3RD GENERATION; Future    5. Overweight (BMI 25.0-29. 9)  Discussed importance of selection of foods, portion control, increased physical activity and weight loss. Discussed that this all may help to improve control of his diabetes. Will follow-up at next appointment      After care summary printed and reviewed with patient. Plan reviewed with patient. Questions answered. Patient verbalized understanding of plan and is in agreement with plan. Patient to follow up in two weeks or earlier if symptoms worsen. Encouraged the use of my chart.     SHAW HeltonC

## 2019-11-14 RX ORDER — BLOOD-GLUCOSE METER
EACH MISCELLANEOUS
Qty: 1 EACH | Refills: 0 | Status: SHIPPED | OUTPATIENT
Start: 2019-11-14 | End: 2021-05-05

## 2019-11-14 RX ORDER — LANCING DEVICE/LANCETS
KIT MISCELLANEOUS
Qty: 1 KIT | Refills: 0 | Status: SHIPPED | OUTPATIENT
Start: 2019-11-14 | End: 2021-05-05

## 2019-12-13 PROBLEM — Z13.220 ENCOUNTER FOR LIPID SCREENING FOR CARDIOVASCULAR DISEASE: Status: RESOLVED | Noted: 2019-11-13 | Resolved: 2019-12-13

## 2019-12-13 PROBLEM — Z13.6 ENCOUNTER FOR LIPID SCREENING FOR CARDIOVASCULAR DISEASE: Status: RESOLVED | Noted: 2019-11-13 | Resolved: 2019-12-13

## 2019-12-13 PROBLEM — Z13.29 SCREENING FOR THYROID DISORDER: Status: RESOLVED | Noted: 2019-11-13 | Resolved: 2019-12-13

## 2020-10-07 ENCOUNTER — HOSPITAL ENCOUNTER (EMERGENCY)
Age: 45
Discharge: HOME | End: 2020-10-07
Attending: EMERGENCY MEDICINE
Payer: COMMERCIAL

## 2020-10-07 ENCOUNTER — APPOINTMENT (OUTPATIENT)
Dept: GENERAL RADIOLOGY | Age: 45
End: 2020-10-07
Attending: EMERGENCY MEDICINE
Payer: COMMERCIAL

## 2020-10-07 VITALS
WEIGHT: 182 LBS | OXYGEN SATURATION: 99 % | DIASTOLIC BLOOD PRESSURE: 84 MMHG | HEIGHT: 69 IN | HEART RATE: 83 BPM | RESPIRATION RATE: 28 BRPM | BODY MASS INDEX: 26.96 KG/M2 | TEMPERATURE: 99.2 F | SYSTOLIC BLOOD PRESSURE: 133 MMHG

## 2020-10-07 DIAGNOSIS — R07.89 ATYPICAL CHEST PAIN: Primary | ICD-10-CM

## 2020-10-07 LAB
ANION GAP SERPL CALC-SCNC: 5 MMOL/L (ref 3–18)
ATRIAL RATE: 79 BPM
BASOPHILS # BLD: 0 K/UL (ref 0–0.1)
BASOPHILS NFR BLD: 0 % (ref 0–2)
BUN SERPL-MCNC: 10 MG/DL (ref 7–18)
BUN/CREAT SERPL: 8 (ref 12–20)
CALCIUM SERPL-MCNC: 9 MG/DL (ref 8.5–10.1)
CALCULATED P AXIS, ECG09: 72 DEGREES
CALCULATED R AXIS, ECG10: 21 DEGREES
CALCULATED T AXIS, ECG11: 26 DEGREES
CHLORIDE SERPL-SCNC: 101 MMOL/L (ref 100–111)
CO2 SERPL-SCNC: 29 MMOL/L (ref 21–32)
CREAT SERPL-MCNC: 1.25 MG/DL (ref 0.6–1.3)
DIAGNOSIS, 93000: NORMAL
DIFFERENTIAL METHOD BLD: ABNORMAL
EOSINOPHIL # BLD: 0 K/UL (ref 0–0.4)
EOSINOPHIL NFR BLD: 0 % (ref 0–5)
ERYTHROCYTE [DISTWIDTH] IN BLOOD BY AUTOMATED COUNT: 14.1 % (ref 11.6–14.5)
GLUCOSE SERPL-MCNC: 282 MG/DL (ref 74–99)
HCT VFR BLD AUTO: 40.9 % (ref 36–48)
HGB BLD-MCNC: 13.9 G/DL (ref 13–16)
LYMPHOCYTES # BLD: 0.8 K/UL (ref 0.9–3.6)
LYMPHOCYTES NFR BLD: 20 % (ref 21–52)
MAGNESIUM SERPL-MCNC: 2 MG/DL (ref 1.6–2.6)
MCH RBC QN AUTO: 28.3 PG (ref 24–34)
MCHC RBC AUTO-ENTMCNC: 34 G/DL (ref 31–37)
MCV RBC AUTO: 83.1 FL (ref 74–97)
MONOCYTES # BLD: 0.7 K/UL (ref 0.05–1.2)
MONOCYTES NFR BLD: 17 % (ref 3–10)
NEUTS SEG # BLD: 2.7 K/UL (ref 1.8–8)
NEUTS SEG NFR BLD: 63 % (ref 40–73)
P-R INTERVAL, ECG05: 160 MS
PLATELET # BLD AUTO: 161 K/UL (ref 135–420)
PMV BLD AUTO: 10.7 FL (ref 9.2–11.8)
POTASSIUM SERPL-SCNC: 3.8 MMOL/L (ref 3.5–5.5)
Q-T INTERVAL, ECG07: 382 MS
QRS DURATION, ECG06: 90 MS
QTC CALCULATION (BEZET), ECG08: 438 MS
RBC # BLD AUTO: 4.92 M/UL (ref 4.7–5.5)
SODIUM SERPL-SCNC: 135 MMOL/L (ref 136–145)
TROPONIN I SERPL-MCNC: <0.02 NG/ML (ref 0–0.04)
VENTRICULAR RATE, ECG03: 79 BPM
WBC # BLD AUTO: 4.3 K/UL (ref 4.6–13.2)

## 2020-10-07 PROCEDURE — 84484 ASSAY OF TROPONIN QUANT: CPT

## 2020-10-07 PROCEDURE — 71046 X-RAY EXAM CHEST 2 VIEWS: CPT

## 2020-10-07 PROCEDURE — 93005 ELECTROCARDIOGRAM TRACING: CPT

## 2020-10-07 PROCEDURE — 83735 ASSAY OF MAGNESIUM: CPT

## 2020-10-07 PROCEDURE — 80048 BASIC METABOLIC PNL TOTAL CA: CPT

## 2020-10-07 PROCEDURE — 99284 EMERGENCY DEPT VISIT MOD MDM: CPT

## 2020-10-07 PROCEDURE — 85025 COMPLETE CBC W/AUTO DIFF WBC: CPT

## 2020-10-07 NOTE — ED TRIAGE NOTES
Patient presents to ER for C/O intermittent Chest pain/tightness that began several days ago and has not gone away. Patient C/O loss of balance this morning while walking to the restroom. Denies any N/V/D.

## 2020-10-07 NOTE — ED PROVIDER NOTES
HPI patient complains of episodic chest pains for the past several weeks. .  Another episode this morning when he woke up. He describes as a dull aching pain under his left breast and is nonradiating. He says the pain lasts for about 5 to 10 minutes and then resolves. At the present time, patient says the pain is gone and he is asymptomatic. He said he came in today to be evaluated because he has been having these episodic pains for quite some time and wanted to have it checked. He denies any shortness of breath. He denies abdominal pain or nausea vomiting. No other specifics given at this time. Past Medical History:   Diagnosis Date    Asthma     Diabetes ([facility] [address]        No past surgical history on file. No family history on file.     Social History     Socioeconomic History    Marital status: SINGLE     Spouse name: Not on file    Number of children: Not on file    Years of education: Not on file    Highest education level: Not on file   Occupational History    Not on file   Social Needs    Financial resource strain: Not on file    Food insecurity     Worry: Not on file     Inability: Not on file    Transportation needs     Medical: Not on file     Non-medical: Not on file   Tobacco Use    Smoking status: Former Smoker    Smokeless tobacco: Never Used   Substance and Sexual Activity    Alcohol use: Yes     Comment: Occasionally    Drug use: No    Sexual activity: Not on file   Lifestyle    Physical activity     Days per week: Not on file     Minutes per session: Not on file    Stress: Not on file   Relationships    Social connections     Talks on phone: Not on file     Gets together: Not on file     Attends Synagogue service: Not on file     Active member of club or organization: Not on file     Attends meetings of clubs or organizations: Not on file     Relationship status: Not on file    Intimate partner violence     Fear of current or ex partner: Not on file     Emotionally abused: Not on file     Physically abused: Not on file     Forced sexual activity: Not on file   Other Topics Concern    Not on file   Social History Narrative    Not on file         ALLERGIES: Shellfish derived    Review of Systems   Constitutional: Negative. HENT: Negative. Eyes: Negative. Respiratory: Negative. Cardiovascular: Positive for chest pain. Gastrointestinal: Negative. Genitourinary: Negative. Musculoskeletal: Negative. Neurological: Negative. Psychiatric/Behavioral: Negative. Vitals:    10/07/20 0649   BP: 131/80   Pulse: 80   Resp: 20   Temp: 99.2 °F (37.3 °C)   SpO2: 96%   Weight: 82.6 kg (182 lb)   Height: 5' 9\" (1.753 m)            Physical Exam  Vitals signs and nursing note reviewed. Constitutional:       Appearance: He is well-developed. HENT:      Head: Normocephalic and atraumatic. Eyes:      Pupils: Pupils are equal, round, and reactive to light. Neck:      Musculoskeletal: Neck supple. Cardiovascular:      Rate and Rhythm: Normal rate and regular rhythm. Pulmonary:      Effort: Pulmonary effort is normal.      Breath sounds: Normal breath sounds. Abdominal:      Palpations: Abdomen is soft. Musculoskeletal: Normal range of motion. Skin:     General: Skin is warm and dry. Neurological:      Mental Status: He is alert and oriented to person, place, and time. MDM  Number of Diagnoses or Management Options  Diagnosis management comments: EKG was read myself at 6:40 AM showing normal sinus rhythm at a rate of 79 with no acute changes    PA and lateral chest x-ray was interpreted myself as showing no acute changes. I reviewed the results of the ER evaluation with the patient and he understands and agrees with the disposition and follow-up plan.   Alicia Barriga MD 9:00 AM         Procedures

## 2020-10-07 NOTE — ED NOTES
Shila Olivera is a 39 y.o. male that was discharged in good condition. The patients diagnosis, condition and treatment were explained to  patient and aftercare instructions were given. The patient verbalized understanding. Patient armband removed and shredded.

## 2020-10-07 NOTE — DISCHARGE INSTRUCTIONS

## 2020-10-07 NOTE — LETTER
[address] [place] [address] [facility] 
[address] [facility] [address] [phone] Work/School Note Date: 10/7/2020 To Whom It May concern: 
 
Cuong Sterling was seen and treated today in the emergency room by the following provider(s): 
Attending Provider: Suleiman Salmeron MD.   
 
Cuong Sterling may return to work on October 8, 2020 Merritt Dey Sincerely, 
 
 
 
 
Krystle Wilks MD

## 2021-05-05 ENCOUNTER — APPOINTMENT (OUTPATIENT)
Dept: GENERAL RADIOLOGY | Age: 46
End: 2021-05-05
Attending: NURSE PRACTITIONER
Payer: COMMERCIAL

## 2021-05-05 ENCOUNTER — HOSPITAL ENCOUNTER (EMERGENCY)
Age: 46
Discharge: HOME | End: 2021-05-05
Attending: EMERGENCY MEDICINE
Payer: COMMERCIAL

## 2021-05-05 VITALS
RESPIRATION RATE: 16 BRPM | HEART RATE: 78 BPM | WEIGHT: 190 LBS | OXYGEN SATURATION: 100 % | TEMPERATURE: 98.5 F | SYSTOLIC BLOOD PRESSURE: 139 MMHG | DIASTOLIC BLOOD PRESSURE: 90 MMHG | BODY MASS INDEX: 27.2 KG/M2 | HEIGHT: 70 IN

## 2021-05-05 DIAGNOSIS — M25.512 PAIN IN JOINT OF LEFT SHOULDER: Primary | ICD-10-CM

## 2021-05-05 PROCEDURE — 73030 X-RAY EXAM OF SHOULDER: CPT

## 2021-05-05 PROCEDURE — 99283 EMERGENCY DEPT VISIT LOW MDM: CPT

## 2021-05-05 PROCEDURE — 74011250637 HC RX REV CODE- 250/637: Performed by: NURSE PRACTITIONER

## 2021-05-05 RX ORDER — NAPROXEN 250 MG/1
500 TABLET ORAL
Status: COMPLETED | OUTPATIENT
Start: 2021-05-05 | End: 2021-05-05

## 2021-05-05 RX ORDER — NAPROXEN 500 MG/1
500 TABLET ORAL
Qty: 20 TAB | Refills: 0 | Status: SHIPPED | OUTPATIENT
Start: 2021-05-05 | End: 2021-05-15

## 2021-05-05 RX ADMIN — NAPROXEN 500 MG: 250 TABLET ORAL at 13:19

## 2021-05-05 NOTE — ED PROVIDER NOTES
EMERGENCY DEPARTMENT HISTORY AND PHYSICAL EXAM    Date: 5/5/2021  Patient Name: Olga Quick    History of Presenting Illness     Chief Complaint   Patient presents with    Shoulder Pain       History Provided By: patient      Additional History (Context): Olga Quick is a 42-year-old male with past medical history significant for asthma and diabetes who presents to the ER with complaints of gradually worsening left lateral shoulder pain for 1 week. 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 He denies any injury, trauma, or fall. He is left-hand dominant. He denies any swelling, paresthesias, or extremity weakness. Has not tried anything at home. Laying on the affected side seems to make his pain better and internal rotation reaching across his body makes the pain worse. PCP: None        Past History     Past Medical History:  Past Medical History:   Diagnosis Date    Asthma     Diabetes ([facility] [address]        Past Surgical History:  No past surgical history on file. Family History:  History reviewed. No pertinent family history. Social History:  Social History     Tobacco Use    Smoking status: Former Smoker    Smokeless tobacco: Never Used   Substance Use Topics    Alcohol use: Yes     Comment: Occasionally    Drug use: No       Allergies: Allergies   Allergen Reactions    Shellfish Derived Hives         Review of Systems     Review of Systems   Constitutional: Negative for chills and fever. HENT: Negative for nasal congestion, sore throat, rhinorrhea  Eyes: Negative. Respiratory: Negative for cough and for shortness of breath. Cardiovascular: Negative for chest pain and palpitations. Gastrointestinal: Negative for abdominal pain, constipation, diarrhea, nausea and vomiting. Genitourinary: Negative. Negative for difficulty urinating and flank pain. Musculoskeletal: Positive for left shoulder pain. Negative for back pain.  Negative for gait problem and neck pain. Skin: Negative. Allergic/Immunologic: Negative. Neurological: Negative for dizziness, weakness, numbness and headaches. Psychiatric/Behavioral: Negative. All other systems reviewed and are negative. All Other Systems Negative    Physical Exam     Vitals:    05/05/21 1204   BP: (!) 139/90   Pulse: 78   Resp: 16   Temp: 98.5 °F (36.9 °C)   SpO2: 100%   Weight: 86.2 kg (190 lb)   Height: 5' 10\" (1.778 m)     Physical Exam  Vitals signs and nursing note reviewed. Constitutional:       General: He is not in acute distress. Appearance: Normal appearance. He is normal weight. He is not ill-appearing, toxic-appearing or diaphoretic. HENT:      Head: Normocephalic and atraumatic. Eyes:      General: Vision grossly intact. Gaze aligned appropriately. Right eye: No discharge. Left eye: No discharge. Conjunctiva/sclera: Conjunctivae normal.      Pupils: Pupils are equal, round, and reactive to light. Neck:      Musculoskeletal: Full passive range of motion without pain, normal range of motion and neck supple. Cardiovascular:      Rate and Rhythm: Normal rate and regular rhythm. Pulses: Normal pulses. Heart sounds: Normal heart sounds. No murmur. No friction rub. No gallop. Pulmonary:      Effort: Pulmonary effort is normal. No respiratory distress. Breath sounds: Normal breath sounds. No wheezing or rales. Abdominal:      General: Abdomen is flat. Bowel sounds are normal. There is no distension. Palpations: Abdomen is soft. Tenderness: There is no abdominal tenderness. Musculoskeletal: Normal range of motion. Left shoulder: He exhibits tenderness (Proximal deltoid insertion) and pain. He exhibits normal range of motion, no bony tenderness, no swelling, no effusion, no crepitus, no deformity, no laceration, no spasm, normal pulse and normal strength. Comments: Negative drop arm test, Neer's, and Steele.    Skin: General: Skin is warm and dry. Capillary Refill: Capillary refill takes less than 2 seconds. Neurological:      General: No focal deficit present. Mental Status: He is alert and oriented to person, place, and time. Diagnostic Study Results     Labs -   No results found for this or any previous visit (from the past 12 hour(s)). Radiologic Studies -   XR SHOULDER LT AP/LAT MIN 2 V    (Results Pending)     CT Results  (Last 48 hours)    None        CXR Results  (Last 48 hours)    None            Medical Decision Making   I am the first provider for this patient. I reviewed the vital signs, available nursing notes, past medical history, past surgical history, family history and social history. Vital Signs-Reviewed the patient's vital signs. Records Reviewed: Nursing notes, old medical records and any previous labs, imaging, visits, consultations pertinent to patient care    Procedures:  Procedures    ED Course: Progress Notes, Reevaluation, and Consults:  12:09 PM  Initial assessment performed. The patients presenting problems have been discussed, and they/their family are in agreement with the care plan formulated and outlined with them. I have encouraged them to ask questions as they arise throughout their visit. 12:53 PM  X-ray negative for acute process. No evidence of compartment syndrome as pain is not out of proportion and there is no swelling. Appropriate for outpatient management. Will discuss supportive treatment, NSAIDS, RICE and orthopedic follow-up. Discussed treatment plan, return precautions, symptomatic relief, and expected time to improvement. All questions answered. Patient is stable for discharge and outpatient management. Medication use, risk/benefit, side effects, and precautions discussed. The patient was educated extensively on mandatory return criteria as well as was instructed both verbally and written to follow-up with orthopedics within 1 to 2 weeks. The patient verbalized understanding of all instruction provided and agrees with the plan of care. Provider Notes (Medical Decision Making):     Differential diagnosis: Bone spur, calcific tendinitis, overuse injury, sprain/strain, rotator cuff injury. Doubt fracture, dislocation, bursitis. 82-year-old male presents to the ER with complaints of atraumatic left shoulder pain. Does do repetitive overhead lifting for work. A thorough physical exam was completed. Patient has full active range of motion in the left shoulder with tenderness over the proximal deltoid insertion. No erythema, warmth, lymphangitis. MED RECONCILIATION:  Current Facility-Administered Medications   Medication Dose Route Frequency    naproxen (NAPROSYN) tablet 500 mg  500 mg Oral NOW     Current Outpatient Medications   Medication Sig    naproxen (Naprosyn) 500 mg tablet Take 1 Tab by mouth two (2) times daily as needed for Pain for up to 10 days. Disposition:  Home in stable condition    DISCHARGE NOTE:     Patient has been reexamined. Patient has no new complaints, changes, or physical findings. Care plan outlined and precautions discussed. Discussed proper way to take medications. Discussed treatment plan, return precautions, symptomatic relief, and expected time to improvement. All questions answered. Patient is stable for discharge and outpatient management. Patient is ready to go home.     Follow-up Information     Follow up With Specialties Details Why [address] [facility]  Schedule an appointment as soon as possible for a visit  Follow-up from the Emergency Department [place] [address] [address]  [address]    [address] [facility] EMERGENCY DEPT Emergency Medicine  As needed, If symptoms worsen [address] [facility]  [phone]          Current Discharge Medication List      START taking these medications    Details naproxen (Naprosyn) 500 mg tablet Take 1 Tab by mouth two (2) times daily as needed for Pain for up to 10 days. Qty: 20 Tab, Refills: 0                   Diagnosis     Clinical Impression:   1. Pain in joint of left shoulder          Dictation disclaimer:  Please note that this dictation was completed with [employer], the computer voice recognition software. Quite often unanticipated grammatical, syntax, homophones, and other interpretive errors are inadvertently transcribed by the computer software. Please disregard these errors. Please excuse any errors that have escaped final proofreading.

## 2021-05-05 NOTE — ED TRIAGE NOTES
Patient c/o left shoulder pain x one week. Denies any known injury. States pain worsens with movement.

## 2022-03-18 PROBLEM — E66.3 OVERWEIGHT (BMI 25.0-29.9): Status: ACTIVE | Noted: 2019-11-13

## 2022-03-20 PROBLEM — R73.9 SUGAR BLOOD LEVEL INCREASED: Status: ACTIVE | Noted: 2019-11-13

## 2022-03-20 PROBLEM — E11.9 DIABETES (HCC): Status: ACTIVE | Noted: 2019-11-13

## 2022-03-20 PROBLEM — R73.09 SUGAR BLOOD LEVEL INCREASED: Status: ACTIVE | Noted: 2019-11-13

## 2023-03-12 ENCOUNTER — HOSPITAL ENCOUNTER (EMERGENCY)
Facility: HOSPITAL | Age: 48
Discharge: HOME | End: 2023-03-12
Attending: EMERGENCY MEDICINE

## 2023-03-12 VITALS
HEART RATE: 63 BPM | WEIGHT: 175 LBS | HEIGHT: 70 IN | BODY MASS INDEX: 25.05 KG/M2 | OXYGEN SATURATION: 100 % | TEMPERATURE: 97.8 F | RESPIRATION RATE: 16 BRPM | DIASTOLIC BLOOD PRESSURE: 107 MMHG | SYSTOLIC BLOOD PRESSURE: 163 MMHG

## 2023-03-12 DIAGNOSIS — J06.9 ACUTE UPPER RESPIRATORY INFECTION: Primary | ICD-10-CM

## 2023-03-12 LAB — GLUCOSE BLD STRIP.AUTO-MCNC: 159 MG/DL (ref 70–110)

## 2023-03-12 PROCEDURE — 99283 EMERGENCY DEPT VISIT LOW MDM: CPT

## 2023-03-12 PROCEDURE — 82962 GLUCOSE BLOOD TEST: CPT

## 2023-03-12 RX ORDER — GUAIFENESIN 600 MG/1
1200 TABLET, EXTENDED RELEASE ORAL 2 TIMES DAILY
Qty: 40 TABLET | Refills: 0 | Status: SHIPPED | OUTPATIENT
Start: 2023-03-12 | End: 2023-03-22

## 2023-03-12 ASSESSMENT — LIFESTYLE VARIABLES: HOW OFTEN DO YOU HAVE A DRINK CONTAINING ALCOHOL: NEVER

## 2023-03-12 ASSESSMENT — PAIN - FUNCTIONAL ASSESSMENT: PAIN_FUNCTIONAL_ASSESSMENT: NONE - DENIES PAIN

## 2023-03-12 NOTE — Clinical Note
Rachael Brady was seen and treated in our emergency department on 3/12/2023. He may return to work on . If you have any questions or concerns, please don't hesitate to call.       Melvin Wallis MD

## 2023-03-12 NOTE — ED PROVIDER NOTES
EMERGENCY DEPARTMENT HISTORY AND PHYSICAL EXAM      Patient Name: Graciela Ortega  MRN: [id number]  YOB: 1975  Provider: Chioma Hadley MD  PCP: No primary care provider on file. Time/Date of evaluation: 7:24 PM EDT on 3/12/23    History of Presenting Illness     Chief Complaint   Patient presents with    Chest Congestion       History Provided By: Patient     History Tildon Tonya):   Graciela Ortega is a 57 y.o. male with a PMHX of diabetes  who presents to the emergency department  by POV C/O congestion and a sore scratchy throat at this time. He is also complaining of some sinus pressure. His symptoms began today. His symptoms began after he spent the weekend with his grandkids. His job told him that he had to go to the doctors to be seen in order to be excused. He denies any fevers, chills, nausea, vomiting or cough. Past History     Past Medical History:  Past Medical History:   Diagnosis Date    Asthma     Diabetes ([facility] [address]        Past Surgical History:  No past surgical history on file. Family History:  No family history on file. Social History:  Social History     Tobacco Use    Smoking status: Former    Smokeless tobacco: Never   Substance Use Topics    Alcohol use: Yes    Drug use: No       Medications:  No current facility-administered medications for this encounter. No current outpatient medications on file. Allergies:   Allergies   Allergen Reactions    Shellfish Allergy Hives       Social Determinants of Health:  Social Determinants of Health     Tobacco Use: Not on file   Alcohol Use: Not At Risk    Frequency of Alcohol Consumption: Never    Average Number of Drinks: Not on file    Frequency of Binge Drinking: Not on file   Financial Resource Strain: Not on file   Food Insecurity: Not on file   Transportation Needs: Not on file   Physical Activity: Not on file   Stress: Not on file   Social Connections: Not on file   Intimate Partner Violence: Not on file Depression: Not on file   Housing Stability: Not on file       Review of Systems     Negative except as listed above in HPI. Physical Exam     Vitals:    03/12/23 1817   BP: (!) 163/107   Pulse: 63   Resp: 16   Temp: 97.8 °F (36.6 °C)   TempSrc: Oral   SpO2: 100%   Weight: 175 lb (79.4 kg)   Height: 5' 10\" (1.778 m)     Constitutional: Non-toxic appearing, no acute distress  Head: Normocephalic, Atraumatic  HEENT: PERRLA, EOMI, mild nasal congestion, very mild oropharyngeal erythema but no tonsillar hypertrophy or exudates. Bilateral TMs are opacified but not erythematous. Neck: Supple  Cardiovascular: Regular rate and rhythm, no murmurs, rubs, or gallops  Chest: Normal work of breathing and chest excursion bilaterally  Lungs: Clear to ausculation bilaterally  Abdomen: Soft, non tender, non distended, normoactive bowel sounds  Back: No evidence of trauma or deformity  Extremities: No evidence of trauma or deformity, no LE edema  Skin: Warm and dry, normal cap refill  Neuro: Alert and appropriate, CN intact, normal speech, strength and sensation full and symmetric bilaterally, normal gait, normal coordination  Psychiatric: Normal mood and affect           Diagnostic Study Results     Labs:  Recent Results (from the past 12 hour(s))   POCT Glucose    Collection Time: 03/12/23  6:18 PM   Result Value Ref Range    POC Glucose 159 (H) 70 - 110 mg/dL       Radiologic Studies: 0  No orders to display       EKG interpretation: (Preliminary)  EKG read by Dr. Quynh Min at 0     Procedures     Procedures    ED Course     7:24 PM EDT JAYDA Min MD) am the first provider for this patient. Initial assessment performed. I reviewed the vital signs, available nursing notes, past medical history, past surgical history, family history and social history. The patients presenting problems have been discussed, and they are in agreement with the care plan formulated and outlined with them.   I have encouraged them to ask questions as they arise throughout their visit. Records Reviewed: Nursing Notes and Old Medical Records    Is this patient to be included in the SEP-1 core measure due to severe sepsis or septic shock? No Exclusion criteria - the patient is NOT to be included for SEP-1 Core Measure due to: 2+ SIRS criteria are not met    MEDICATIONS ADMINISTERED IN THE ED:  Medications - No data to display         Medical Decision Making     CC/HPI Summary, DDx, ED Course, and Reassessment: The patient is a 61-year-old male who presents the ED today with upper respiratory symptoms with sinus pressure and a sore scratchy throat. Overall he is well-appearing. He declines COVID and flu testing at this time. He will be discharged home with a prescription for Mucinex and a work note. He will be advised to follow-up with his primary care physician in 2 to 3 days. Return precautions have been given. Disposition Considerations (tests considered but not done, Admit vs D/C, Shared Decision Making, Pt Expectation of Test or Tx.): COVID, flu, chest x-ray    Critical Care Time:     0    Renzo Alvarez MD    Diagnosis and Disposition     I Mikayla Lockhart MD am the primary clinician of record. DIAGNOSIS:   1.  Acute upper respiratory infection        DISPOSITION        PATIENT REFERRED TO:  [facility] EMERGENCY DEPT  [address] [facility] [address] [phone]    As needed, If symptoms worsen    DISCHARGE MEDICATIONS:  Discharge Medication List as of 3/12/2023  7:29 PM        START taking these medications    Details   guaiFENesin (MUCINEX) 600 MG extended release tablet Take 2 tablets by mouth 2 times daily for 10 days, Disp-40 tablet, R-0Normal             DISCONTINUED MEDICATIONS:  Discharge Medication List as of 3/12/2023  7:29 PM                 (Please note that portions of this note were completed with a voice recognition program.  Efforts were made to edit the dictations but occasionally words are mis-transcribed.)    Amy De Leon MD (electronically signed)        Lavell Betancourt     Please note that this dictation was completed with [employer], the computer voice recognition software. Quite often unanticipated grammatical, syntax, homophones, and other interpretive errors are inadvertently transcribed by the computer software. Please disregard these errors. Please excuse any errors that have escaped final proofreading.        Chau Montano MD  03/14/23 5228

## 2023-03-12 NOTE — ED TRIAGE NOTES
Pt to ED for eval of chest congestion s/p watching his grandkids this weekend and one was congested. Pt also concerned about his glucose. POC glucose 171.

## 2023-03-12 NOTE — ED NOTES
D/c home after reviewing d/c summary from MD. Pt allowed time to ask questions and receive honest answers. Pt ambulated out of [facility] independently.       Twin Valdivia RN  03/12/23 1932

## 2023-04-18 ENCOUNTER — HOSPITAL ENCOUNTER (EMERGENCY)
Facility: HOSPITAL | Age: 48
Discharge: HOME | End: 2023-04-18
Attending: EMERGENCY MEDICINE
Payer: COMMERCIAL

## 2023-04-18 VITALS
WEIGHT: 185 LBS | HEIGHT: 70 IN | OXYGEN SATURATION: 100 % | HEART RATE: 60 BPM | SYSTOLIC BLOOD PRESSURE: 144 MMHG | BODY MASS INDEX: 26.48 KG/M2 | TEMPERATURE: 98.1 F | RESPIRATION RATE: 14 BRPM | DIASTOLIC BLOOD PRESSURE: 76 MMHG

## 2023-04-18 DIAGNOSIS — M79.601 RIGHT ARM PAIN: Primary | ICD-10-CM

## 2023-04-18 PROCEDURE — 99282 EMERGENCY DEPT VISIT SF MDM: CPT

## 2023-04-18 ASSESSMENT — ENCOUNTER SYMPTOMS
VOMITING: 0
SINUS PRESSURE: 0
DIARRHEA: 0
RHINORRHEA: 0
SHORTNESS OF BREATH: 0
ANAL BLEEDING: 0
BACK PAIN: 0
SORE THROAT: 0
CHEST TIGHTNESS: 0
NAUSEA: 0
BLOOD IN STOOL: 0
ABDOMINAL DISTENTION: 0
ABDOMINAL PAIN: 0
TROUBLE SWALLOWING: 0
APNEA: 0
SINUS PAIN: 0
FACIAL SWELLING: 0
CONSTIPATION: 0
EYES NEGATIVE: 1

## 2023-04-18 ASSESSMENT — PAIN - FUNCTIONAL ASSESSMENT: PAIN_FUNCTIONAL_ASSESSMENT: 0-10

## 2023-04-18 ASSESSMENT — PAIN SCALES - GENERAL: PAINLEVEL_OUTOF10: 4

## 2023-04-18 NOTE — ED NOTES
Discharge instructions reviewed with patient. Patient verbalized understanding. Patient advised to follow up as directed on discharge instructions. Patient denies questions, needs or concerns at this time. No s/sx of distress noted.         Rishi Parks RN  04/18/23 7374

## 2023-04-18 NOTE — ED PROVIDER NOTES
return precautions to the ED upon further deterioration. Patient is ready for discharge home. Diagnosis     Clinical Impression:   1. Right arm pain        Disposition: home      [facility] EMERGENCY DEPT  [address] [facility]  [phone]    If symptoms worsen          Medication List      You have not been prescribed any medications. Dictation disclaimer:  Please note that this dictation was completed with [employer], the computer voice recognition software. Quite often unanticipated grammatical, syntax, homophones, and other interpretive errors are inadvertently transcribed by the computer software. Please disregard these errors. Please excuse any errors that have escaped final proofreading.          BETTINA Calderon NP  04/18/23 2918

## 2023-05-15 ENCOUNTER — HOSPITAL ENCOUNTER (EMERGENCY)
Facility: HOSPITAL | Age: 48
Discharge: HOME | End: 2023-05-15
Attending: EMERGENCY MEDICINE
Payer: COMMERCIAL

## 2023-05-15 ENCOUNTER — APPOINTMENT (OUTPATIENT)
Facility: HOSPITAL | Age: 48
End: 2023-05-15
Payer: COMMERCIAL

## 2023-05-15 VITALS
TEMPERATURE: 98.8 F | DIASTOLIC BLOOD PRESSURE: 80 MMHG | HEIGHT: 71 IN | RESPIRATION RATE: 18 BRPM | SYSTOLIC BLOOD PRESSURE: 140 MMHG | OXYGEN SATURATION: 100 % | WEIGHT: 185 LBS | HEART RATE: 73 BPM | BODY MASS INDEX: 25.9 KG/M2

## 2023-05-15 DIAGNOSIS — M79.642 HAND PAIN, LEFT: Primary | ICD-10-CM

## 2023-05-15 PROCEDURE — 99283 EMERGENCY DEPT VISIT LOW MDM: CPT

## 2023-05-15 PROCEDURE — 73120 X-RAY EXAM OF HAND: CPT

## 2023-05-15 RX ORDER — IBUPROFEN 600 MG/1
600 TABLET ORAL 4 TIMES DAILY PRN
Qty: 20 TABLET | Refills: 1 | Status: SHIPPED | OUTPATIENT
Start: 2023-05-15

## 2023-05-15 ASSESSMENT — PAIN DESCRIPTION - FREQUENCY: FREQUENCY: INTERMITTENT

## 2023-05-15 ASSESSMENT — PAIN - FUNCTIONAL ASSESSMENT: PAIN_FUNCTIONAL_ASSESSMENT: 0-10

## 2023-05-15 ASSESSMENT — PAIN DESCRIPTION - LOCATION: LOCATION: HAND

## 2023-05-15 ASSESSMENT — PAIN SCALES - GENERAL: PAINLEVEL_OUTOF10: 4

## 2023-05-15 ASSESSMENT — LIFESTYLE VARIABLES
HOW OFTEN DO YOU HAVE A DRINK CONTAINING ALCOHOL: NEVER
HOW MANY STANDARD DRINKS CONTAINING ALCOHOL DO YOU HAVE ON A TYPICAL DAY: PATIENT DOES NOT DRINK

## 2023-05-15 ASSESSMENT — PAIN DESCRIPTION - ORIENTATION: ORIENTATION: LEFT

## 2023-05-15 ASSESSMENT — PAIN DESCRIPTION - PAIN TYPE: TYPE: ACUTE PAIN

## 2023-05-15 NOTE — ED TRIAGE NOTES
Pain noted to back of L hand when palpating just below knuckles. No bruising or edema noted. Does not recall any injury to hand.

## 2023-05-16 NOTE — ED PROVIDER NOTES
[facility]  [phone]  Schedule an appointment as soon as possible for a visit in 2 days      Christel Olson MD  [address]  [phone]    Schedule an appointment as soon as possible for a visit in 3 days        DISCHARGE MEDICATIONS:  Discharge Medication List as of 5/15/2023 10:35 PM        START taking these medications    Details   ibuprofen (ADVIL;MOTRIN) 600 MG tablet Take 1 tablet by mouth 4 times daily as needed for Pain, Disp-20 tablet, R-1Normal           Controlled Substances Monitoring:     No flowsheet data found.     (Please note that portions of this note were completed with a voice recognition program.  Efforts were made to edit the dictations but occasionally words are mis-transcribed.)    Mamadou Alicia MD (electronically signed)  Attending Emergency Physician          Floyd Rausch MD  05/16/23 9473

## 2023-05-16 NOTE — ED NOTES
Discharge instructions reviewed with patient. Patient verbalized understanding. Patient advised to follow up as directed on discharge instructions. Patient denies questions, needs or concerns at this time. Patient verbalized understanding. No s/sx of distress noted.        Vera Dunbar RN  05/15/23 0206

## 2023-05-24 ENCOUNTER — HOSPITAL ENCOUNTER (EMERGENCY)
Facility: HOSPITAL | Age: 48
Discharge: HOME | End: 2023-05-24
Attending: EMERGENCY MEDICINE
Payer: COMMERCIAL

## 2023-05-24 VITALS
OXYGEN SATURATION: 100 % | BODY MASS INDEX: 25.9 KG/M2 | HEART RATE: 67 BPM | DIASTOLIC BLOOD PRESSURE: 93 MMHG | TEMPERATURE: 97.9 F | RESPIRATION RATE: 18 BRPM | WEIGHT: 185 LBS | SYSTOLIC BLOOD PRESSURE: 149 MMHG | HEIGHT: 71 IN

## 2023-05-24 DIAGNOSIS — R42 LIGHTHEADEDNESS: Primary | ICD-10-CM

## 2023-05-24 LAB
ALBUMIN SERPL-MCNC: 3.7 G/DL (ref 3.4–5)
ALBUMIN/GLOB SERPL: 0.9 (ref 0.8–1.7)
ALP SERPL-CCNC: 69 U/L (ref 45–117)
ALT SERPL-CCNC: 38 U/L (ref 16–61)
ANION GAP SERPL CALC-SCNC: 2 MMOL/L (ref 3–18)
AST SERPL-CCNC: 16 U/L (ref 10–38)
BASOPHILS # BLD: 0 K/UL (ref 0–0.1)
BASOPHILS NFR BLD: 0 % (ref 0–2)
BILIRUB SERPL-MCNC: 0.3 MG/DL (ref 0.2–1)
BUN SERPL-MCNC: 15 MG/DL (ref 7–18)
BUN/CREAT SERPL: 14 (ref 12–20)
CALCIUM SERPL-MCNC: 9.3 MG/DL (ref 8.5–10.1)
CHLORIDE SERPL-SCNC: 109 MMOL/L (ref 100–111)
CO2 SERPL-SCNC: 29 MMOL/L (ref 21–32)
CREAT SERPL-MCNC: 1.1 MG/DL (ref 0.6–1.3)
DIFFERENTIAL METHOD BLD: ABNORMAL
EOSINOPHIL # BLD: 0.1 K/UL (ref 0–0.4)
EOSINOPHIL NFR BLD: 2 % (ref 0–5)
ERYTHROCYTE [DISTWIDTH] IN BLOOD BY AUTOMATED COUNT: 14.2 % (ref 11.6–14.5)
GLOBULIN SER CALC-MCNC: 4.1 G/DL (ref 2–4)
GLUCOSE BLD STRIP.AUTO-MCNC: 133 MG/DL (ref 70–110)
GLUCOSE SERPL-MCNC: 137 MG/DL (ref 74–99)
HCT VFR BLD AUTO: 37.2 % (ref 36–48)
HGB BLD-MCNC: 12.4 G/DL (ref 13–16)
IMM GRANULOCYTES # BLD AUTO: 0 K/UL (ref 0–0.04)
IMM GRANULOCYTES NFR BLD AUTO: 0 % (ref 0–0.5)
LYMPHOCYTES # BLD: 2.5 K/UL (ref 0.9–3.6)
LYMPHOCYTES NFR BLD: 42 % (ref 21–52)
MCH RBC QN AUTO: 28.2 PG (ref 24–34)
MCHC RBC AUTO-ENTMCNC: 33.3 G/DL (ref 31–37)
MCV RBC AUTO: 84.7 FL (ref 78–100)
MONOCYTES # BLD: 0.5 K/UL (ref 0.05–1.2)
MONOCYTES NFR BLD: 9 % (ref 3–10)
NEUTS SEG # BLD: 2.7 K/UL (ref 1.8–8)
NEUTS SEG NFR BLD: 46 % (ref 40–73)
NRBC # BLD: 0 K/UL (ref 0–0.01)
NRBC BLD-RTO: 0 PER 100 WBC
PLATELET # BLD AUTO: 196 K/UL (ref 135–420)
PMV BLD AUTO: 10.1 FL (ref 9.2–11.8)
POTASSIUM SERPL-SCNC: 3.7 MMOL/L (ref 3.5–5.5)
PROT SERPL-MCNC: 7.8 G/DL (ref 6.4–8.2)
RBC # BLD AUTO: 4.39 M/UL (ref 4.35–5.65)
SODIUM SERPL-SCNC: 140 MMOL/L (ref 136–145)
WBC # BLD AUTO: 6 K/UL (ref 4.6–13.2)

## 2023-05-24 PROCEDURE — 93005 ELECTROCARDIOGRAM TRACING: CPT | Performed by: EMERGENCY MEDICINE

## 2023-05-24 PROCEDURE — 99284 EMERGENCY DEPT VISIT MOD MDM: CPT

## 2023-05-24 PROCEDURE — 80053 COMPREHEN METABOLIC PANEL: CPT

## 2023-05-24 PROCEDURE — 85025 COMPLETE CBC W/AUTO DIFF WBC: CPT

## 2023-05-24 PROCEDURE — 82962 GLUCOSE BLOOD TEST: CPT

## 2023-05-24 ASSESSMENT — LIFESTYLE VARIABLES
HOW MANY STANDARD DRINKS CONTAINING ALCOHOL DO YOU HAVE ON A TYPICAL DAY: PATIENT DOES NOT DRINK
HOW OFTEN DO YOU HAVE A DRINK CONTAINING ALCOHOL: NEVER

## 2023-05-24 ASSESSMENT — PAIN - FUNCTIONAL ASSESSMENT: PAIN_FUNCTIONAL_ASSESSMENT: NONE - DENIES PAIN

## 2023-05-24 ASSESSMENT — ENCOUNTER SYMPTOMS
SHORTNESS OF BREATH: 0
COLOR CHANGE: 0
SORE THROAT: 0
ABDOMINAL PAIN: 0

## 2023-05-24 NOTE — ED PROVIDER NOTES
ED Triage Vitals [05/24/23 1749]   BP Temp Temp Source Pulse Respirations SpO2 Height Weight - Scale   (!) 150/92 97.9 °F (36.6 °C) Oral 71 17 98 % 5' 11\" (1.803 m) 185 lb (83.9 kg)       Physical Exam  Constitutional:       General: He is not in acute distress. Appearance: Normal appearance. He is not ill-appearing. HENT:      Head: Normocephalic. Right Ear: Tympanic membrane normal.      Left Ear: Tympanic membrane normal.      Mouth/Throat:      Mouth: Mucous membranes are moist.   Eyes:      Extraocular Movements: Extraocular movements intact. Pupils: Pupils are equal, round, and reactive to light. Cardiovascular:      Rate and Rhythm: Normal rate and regular rhythm. Pulmonary:      Effort: Pulmonary effort is normal.      Breath sounds: Normal breath sounds. Musculoskeletal:         General: Normal range of motion. Cervical back: Normal range of motion and neck supple. Neurological:      General: No focal deficit present. Mental Status: He is alert and oriented to person, place, and time.    Psychiatric:         Mood and Affect: Mood normal.         Behavior: Behavior normal.       Recent Results (from the past 24 hour(s))   EKG 12 Lead    Collection Time: 05/24/23  5:45 PM   Result Value Ref Range    Ventricular Rate 69 BPM    Atrial Rate 69 BPM    P-R Interval 176 ms    QRS Duration 90 ms    Q-T Interval 412 ms    QTc Calculation (Bazett) 441 ms    P Axis 69 degrees    R Axis 20 degrees    T Axis 7 degrees    Diagnosis       Normal sinus rhythm  Normal ECG  When compared with ECG of 07-OCT-2020 06:42,  No significant change was found     POCT Glucose    Collection Time: 05/24/23  5:56 PM   Result Value Ref Range    POC Glucose 133 (H) 70 - 110 mg/dL         PROCEDURES:  Unless otherwise noted below, none           EMERGENCY DEPARTMENT COURSE and DIFFERENTIALDIAGNOSIS/ MDM:   Vitals:    Vitals:    05/24/23 1749   BP: (!) 150/92   Pulse: 71   Resp: 17   Temp: 97.9 °F (36.6

## 2023-05-25 LAB
EKG ATRIAL RATE: 69 BPM
EKG DIAGNOSIS: NORMAL
EKG P AXIS: 69 DEGREES
EKG P-R INTERVAL: 176 MS
EKG Q-T INTERVAL: 412 MS
EKG QRS DURATION: 90 MS
EKG QTC CALCULATION (BAZETT): 441 MS
EKG R AXIS: 20 DEGREES
EKG T AXIS: 7 DEGREES
EKG VENTRICULAR RATE: 69 BPM

## 2023-05-25 PROCEDURE — 93010 ELECTROCARDIOGRAM REPORT: CPT | Performed by: INTERNAL MEDICINE

## 2023-05-31 ENCOUNTER — HOSPITAL ENCOUNTER (EMERGENCY)
Facility: HOSPITAL | Age: 48
Discharge: HOME | End: 2023-05-31
Attending: EMERGENCY MEDICINE
Payer: COMMERCIAL

## 2023-05-31 VITALS
DIASTOLIC BLOOD PRESSURE: 75 MMHG | RESPIRATION RATE: 18 BRPM | TEMPERATURE: 97.4 F | SYSTOLIC BLOOD PRESSURE: 134 MMHG | OXYGEN SATURATION: 100 % | HEART RATE: 71 BPM

## 2023-05-31 DIAGNOSIS — M54.50 ACUTE MIDLINE LOW BACK PAIN WITHOUT SCIATICA: Primary | ICD-10-CM

## 2023-05-31 PROCEDURE — 99284 EMERGENCY DEPT VISIT MOD MDM: CPT

## 2023-05-31 PROCEDURE — 6360000002 HC RX W HCPCS: Performed by: EMERGENCY MEDICINE

## 2023-05-31 PROCEDURE — 6370000000 HC RX 637 (ALT 250 FOR IP): Performed by: EMERGENCY MEDICINE

## 2023-05-31 PROCEDURE — 96372 THER/PROPH/DIAG INJ SC/IM: CPT

## 2023-05-31 RX ORDER — KETOROLAC TROMETHAMINE 15 MG/ML
15 INJECTION, SOLUTION INTRAMUSCULAR; INTRAVENOUS
Status: COMPLETED | OUTPATIENT
Start: 2023-05-31 | End: 2023-05-31

## 2023-05-31 RX ORDER — METHOCARBAMOL 750 MG/1
1500 TABLET, FILM COATED ORAL
Status: COMPLETED | OUTPATIENT
Start: 2023-05-31 | End: 2023-05-31

## 2023-05-31 RX ORDER — METHOCARBAMOL 500 MG/1
500 TABLET, FILM COATED ORAL 4 TIMES DAILY
Qty: 40 TABLET | Refills: 0 | Status: SHIPPED | OUTPATIENT
Start: 2023-05-31 | End: 2023-06-10

## 2023-05-31 RX ORDER — LIDOCAINE 50 MG/G
1 PATCH TOPICAL DAILY
Qty: 14 PATCH | Refills: 0 | Status: SHIPPED | OUTPATIENT
Start: 2023-05-31 | End: 2023-06-10

## 2023-05-31 RX ORDER — LIDOCAINE 4 G/G
1 PATCH TOPICAL
Status: DISCONTINUED | OUTPATIENT
Start: 2023-05-31 | End: 2023-05-31 | Stop reason: HOSPADM

## 2023-05-31 RX ADMIN — KETOROLAC TROMETHAMINE 15 MG: 15 INJECTION, SOLUTION INTRAMUSCULAR; INTRAVENOUS at 16:47

## 2023-05-31 RX ADMIN — METHOCARBAMOL 1500 MG: 750 TABLET ORAL at 16:47

## 2023-05-31 ASSESSMENT — PAIN DESCRIPTION - ORIENTATION: ORIENTATION: LOWER

## 2023-05-31 ASSESSMENT — PAIN DESCRIPTION - DESCRIPTORS: DESCRIPTORS: ACHING

## 2023-05-31 ASSESSMENT — PAIN DESCRIPTION - LOCATION: LOCATION: BACK

## 2023-05-31 ASSESSMENT — PAIN - FUNCTIONAL ASSESSMENT: PAIN_FUNCTIONAL_ASSESSMENT: 0-10

## 2023-05-31 ASSESSMENT — PAIN SCALES - GENERAL: PAINLEVEL_OUTOF10: 6

## 2023-05-31 ASSESSMENT — PAIN DESCRIPTION - PAIN TYPE: TYPE: ACUTE PAIN

## 2023-05-31 NOTE — ED PROVIDER NOTES
discussed, and they are in agreement with the care plan formulated and outlined with them. I have encouraged them to ask questions as they arise throughout their visit. Vitals were stable on arrival.  He was afebrile. My differential diagnosis included but was not limited to low back pain. I did give him a lidocaine patch as well as Robaxin for pain control. He had no red flag symptoms for back pain. Positive and negative test results were discussed. My clinical assessment and recommendations were discussed. They agree with the plan of discharge. Return precautions were discussed. All questions were answered and they are in agreement with plan. RECORDS REVIEWED: Nursing Notes    Is this patient to be included in the SEP-1 core measure due to severe sepsis or septic shock? No Exclusion criteria - the patient is NOT to be included for SEP-1 Core Measure due to: Infection is not suspected    MEDICATIONS ADMINISTERED IN THE ED:  Medications - No data to display         None    Critical Care: None    Diagnosis and Disposition   DISPOSITION Decision To Discharge 05/31/2023 04:30:01 PM    DISCHARGE NOTE:  Lavonne Lazcano  results have been reviewed with him. He has been counseled regarding his diagnosis, treatment, and plan. He verbally conveys understanding and agreement of the signs, symptoms, diagnosis, treatment and prognosis and additionally agrees to follow up as discussed. He also agrees with the care-plan and conveys that all of his questions have been answered. I have also provided discharge instructions for him that include: educational information regarding their diagnosis and treatment, and list of reasons why they would want to return to the ED prior to their follow-up appointment, should his condition change. He has been provided with education for proper emergency department utilization. CLINICAL IMPRESSION:  1.  Acute midline low back pain without sciatica        PLAN:  D/C

## 2023-06-04 ENCOUNTER — HOSPITAL ENCOUNTER (EMERGENCY)
Facility: HOSPITAL | Age: 48
Discharge: HOME | End: 2023-06-04
Attending: EMERGENCY MEDICINE
Payer: COMMERCIAL

## 2023-06-04 VITALS
BODY MASS INDEX: 25.9 KG/M2 | HEART RATE: 67 BPM | RESPIRATION RATE: 14 BRPM | OXYGEN SATURATION: 100 % | TEMPERATURE: 98 F | HEIGHT: 71 IN | DIASTOLIC BLOOD PRESSURE: 69 MMHG | WEIGHT: 185 LBS | SYSTOLIC BLOOD PRESSURE: 127 MMHG

## 2023-06-04 DIAGNOSIS — T80.89XA IRRITATION OF INJECTION SITE, INITIAL ENCOUNTER: Primary | ICD-10-CM

## 2023-06-04 DIAGNOSIS — M79.601 RIGHT ARM PAIN: ICD-10-CM

## 2023-06-04 PROCEDURE — 99282 EMERGENCY DEPT VISIT SF MDM: CPT

## 2023-06-04 ASSESSMENT — ENCOUNTER SYMPTOMS
VOMITING: 0
SINUS PRESSURE: 0
DIARRHEA: 0
TROUBLE SWALLOWING: 0
CONSTIPATION: 0
RHINORRHEA: 0
SORE THROAT: 0
SHORTNESS OF BREATH: 0
FACIAL SWELLING: 0
CHEST TIGHTNESS: 0
SINUS PAIN: 0
APNEA: 0
BLOOD IN STOOL: 0
ABDOMINAL DISTENTION: 0
BACK PAIN: 0
EYES NEGATIVE: 1
ABDOMINAL PAIN: 0
NAUSEA: 0
ANAL BLEEDING: 0

## 2023-06-04 ASSESSMENT — PAIN SCALES - GENERAL: PAINLEVEL_OUTOF10: 7

## 2023-06-04 ASSESSMENT — PAIN - FUNCTIONAL ASSESSMENT: PAIN_FUNCTIONAL_ASSESSMENT: 0-10

## 2023-06-19 ENCOUNTER — APPOINTMENT (OUTPATIENT)
Facility: HOSPITAL | Age: 48
End: 2023-06-19
Payer: COMMERCIAL

## 2023-06-19 ENCOUNTER — HOSPITAL ENCOUNTER (EMERGENCY)
Facility: HOSPITAL | Age: 48
Discharge: HOME | End: 2023-06-19
Attending: EMERGENCY MEDICINE
Payer: COMMERCIAL

## 2023-06-19 VITALS
SYSTOLIC BLOOD PRESSURE: 123 MMHG | HEIGHT: 71 IN | HEART RATE: 74 BPM | OXYGEN SATURATION: 100 % | RESPIRATION RATE: 21 BRPM | WEIGHT: 185 LBS | TEMPERATURE: 97.9 F | BODY MASS INDEX: 25.9 KG/M2 | DIASTOLIC BLOOD PRESSURE: 93 MMHG

## 2023-06-19 DIAGNOSIS — M79.642 LEFT HAND PAIN: Primary | ICD-10-CM

## 2023-06-19 PROCEDURE — 99283 EMERGENCY DEPT VISIT LOW MDM: CPT

## 2023-06-19 PROCEDURE — 73120 X-RAY EXAM OF HAND: CPT

## 2023-06-19 ASSESSMENT — PAIN DESCRIPTION - FREQUENCY: FREQUENCY: INTERMITTENT

## 2023-06-19 ASSESSMENT — ENCOUNTER SYMPTOMS
VOMITING: 0
SHORTNESS OF BREATH: 0
DIARRHEA: 0

## 2023-06-19 ASSESSMENT — PAIN DESCRIPTION - LOCATION: LOCATION: HAND

## 2023-06-19 ASSESSMENT — LIFESTYLE VARIABLES
HOW OFTEN DO YOU HAVE A DRINK CONTAINING ALCOHOL: 2-4 TIMES A MONTH
HOW MANY STANDARD DRINKS CONTAINING ALCOHOL DO YOU HAVE ON A TYPICAL DAY: 1 OR 2

## 2023-06-19 ASSESSMENT — PAIN DESCRIPTION - ORIENTATION: ORIENTATION: LEFT;POSTERIOR

## 2023-06-19 ASSESSMENT — PAIN DESCRIPTION - PAIN TYPE: TYPE: ACUTE PAIN

## 2023-06-19 ASSESSMENT — PAIN - FUNCTIONAL ASSESSMENT: PAIN_FUNCTIONAL_ASSESSMENT: 0-10

## 2023-06-19 NOTE — ED PROVIDER NOTES
EMERGENCY DEPARTMENT HISTORY AND PHYSICAL EXAM        Date: 6/19/2023  Patient Name: Elaine Cornell    History of Presenting Illness     Chief Complaint   Patient presents with    Hand Pain       History Provided By: History obtained from patient  Accompanied in the ED by none    HPI: Elaine Cornell, 57 y.o. 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 He is not experiencing excessive thirst or urination    No nausea, vomiting, diarrhea, fever, chills, chest pain, shortness of breath, leg swelling     There are no other complaints, changes, or physical findings at this time. PCP: None None    No current facility-administered medications on file prior to encounter. Current Outpatient Medications on File Prior to Encounter   Medication Sig Dispense Refill    ibuprofen (ADVIL;MOTRIN) 600 MG tablet Take 1 tablet by mouth 4 times daily as needed for Pain 20 tablet 1         Past History     Past Medical History:  Past Medical History:   Diagnosis Date    Asthma     Diabetes ([address]        Past Surgical History:  No past surgical history on file. Family History:  No family history on file. Social History:  Social History     Tobacco Use    Smoking status: Former    Smokeless tobacco: Never   Substance Use Topics    Alcohol use: Yes    Drug use: No       Allergies: Allergies   Allergen Reactions    Shellfish Allergy Hives         Review of Systems   Review of Systems   Constitutional:  Negative for activity change.    Respiratory:

## 2023-07-13 ENCOUNTER — HOSPITAL ENCOUNTER (EMERGENCY)
Facility: HOSPITAL | Age: 48
Discharge: HOME | End: 2023-07-13
Attending: EMERGENCY MEDICINE
Payer: COMMERCIAL

## 2023-07-13 VITALS
DIASTOLIC BLOOD PRESSURE: 75 MMHG | TEMPERATURE: 98.1 F | BODY MASS INDEX: 26.6 KG/M2 | WEIGHT: 190 LBS | RESPIRATION RATE: 16 BRPM | HEART RATE: 72 BPM | SYSTOLIC BLOOD PRESSURE: 119 MMHG | HEIGHT: 71 IN | OXYGEN SATURATION: 100 %

## 2023-07-13 DIAGNOSIS — S39.012A STRAIN OF LUMBAR REGION, INITIAL ENCOUNTER: Primary | ICD-10-CM

## 2023-07-13 PROCEDURE — 6360000002 HC RX W HCPCS: Performed by: HEALTH CARE PROVIDER

## 2023-07-13 PROCEDURE — 96372 THER/PROPH/DIAG INJ SC/IM: CPT

## 2023-07-13 PROCEDURE — 99284 EMERGENCY DEPT VISIT MOD MDM: CPT

## 2023-07-13 PROCEDURE — 6370000000 HC RX 637 (ALT 250 FOR IP): Performed by: HEALTH CARE PROVIDER

## 2023-07-13 RX ORDER — LIDOCAINE 4 G/G
1 PATCH TOPICAL
Status: DISCONTINUED | OUTPATIENT
Start: 2023-07-13 | End: 2023-07-13 | Stop reason: HOSPADM

## 2023-07-13 RX ORDER — KETOROLAC TROMETHAMINE 15 MG/ML
15 INJECTION, SOLUTION INTRAMUSCULAR; INTRAVENOUS ONCE
Status: COMPLETED | OUTPATIENT
Start: 2023-07-13 | End: 2023-07-13

## 2023-07-13 RX ADMIN — KETOROLAC TROMETHAMINE 15 MG: 15 INJECTION, SOLUTION INTRAMUSCULAR; INTRAVENOUS at 18:28

## 2023-07-13 ASSESSMENT — PAIN SCALES - GENERAL: PAINLEVEL_OUTOF10: 7

## 2023-07-13 ASSESSMENT — ENCOUNTER SYMPTOMS
SHORTNESS OF BREATH: 0
VOMITING: 0
DIARRHEA: 0

## 2023-07-13 ASSESSMENT — PAIN DESCRIPTION - LOCATION: LOCATION: BACK

## 2023-07-13 ASSESSMENT — PAIN - FUNCTIONAL ASSESSMENT: PAIN_FUNCTIONAL_ASSESSMENT: 0-10

## 2023-07-13 NOTE — ED TRIAGE NOTES
Low back pain and spasms x 3 days, works at [employer], increased heavy lifting past 3 days

## 2023-07-13 NOTE — ED NOTES
Discharge instructions given to patient. Follow up information provided. Verbalized understanding.       Wilmer Ramirez RN  07/13/23 1051

## 2023-07-13 NOTE — DISCHARGE INSTRUCTIONS
Helpful therapies for low back pain include heat pads, massage, topical pain relievers such as BenGay, and maintaining your mobility. Do not rest in bed until the pain goes away, you will have to ambulate to restore functional status to the back and only lie in bed if pain is intolerable or new symptoms develop. Take over the counter anti-inflammatory and pain medicine as needed during back strain. Return to ED if: neurological deficit in legs or saddle region (numb, tingling, weak), incontinence or urinary retention, abrupt increases in pain, or unresolved pain.

## 2023-07-13 NOTE — ED PROVIDER NOTES
EMERGENCY DEPARTMENT HISTORY AND PHYSICAL EXAM        Date: 7/13/2023  Patient Name: Vijay Valdez    History of Presenting Illness     Chief Complaint   Patient presents with    Back Pain       History Provided By: History obtained from patient  Accompanied in the ED by none    HPI: Vijay Valdez, 57 y.o. male PMHx significant for asthma, diabetes presents  to the ED with cc of back pain x 2 days    He works at [employer] and has been lifting a lot because it is prime week. He denies any significant single event of hearing a pop or crack in his back, denies any trauma. He denies any numbness tingling or weakness in the extremities. He denies loss of bowel or bladder control. No nausea, vomiting, diarrhea, fever, chills, chest pain, shortness of breath, leg swelling     There are no other complaints, changes, or physical findings at this time. PCP: None None    No current facility-administered medications on file prior to encounter. Current Outpatient Medications on File Prior to Encounter   Medication Sig Dispense Refill    ibuprofen (ADVIL;MOTRIN) 600 MG tablet Take 1 tablet by mouth 4 times daily as needed for Pain 20 tablet 1         Past History     Past Medical History:  Past Medical History:   Diagnosis Date    Asthma     Diabetes ([address])        Past Surgical History:  History reviewed. No pertinent surgical history. Family History:  History reviewed. No pertinent family history. Social History:  Social History     Tobacco Use    Smoking status: Former    Smokeless tobacco: Never   Substance Use Topics    Alcohol use: Yes    Drug use: No       Allergies: Allergies   Allergen Reactions    Shellfish Allergy Hives         Review of Systems   Review of Systems   Constitutional:  Negative for activity change. Respiratory:  Negative for shortness of breath. Cardiovascular:  Negative for chest pain and leg swelling. Gastrointestinal:  Negative for diarrhea and vomiting.        Physical Exam

## 2023-07-17 ENCOUNTER — HOSPITAL ENCOUNTER (EMERGENCY)
Facility: HOSPITAL | Age: 48
Discharge: HOME | End: 2023-07-17
Attending: EMERGENCY MEDICINE
Payer: COMMERCIAL

## 2023-07-17 VITALS
OXYGEN SATURATION: 100 % | DIASTOLIC BLOOD PRESSURE: 80 MMHG | SYSTOLIC BLOOD PRESSURE: 143 MMHG | RESPIRATION RATE: 18 BRPM | HEIGHT: 71 IN | TEMPERATURE: 98.6 F | BODY MASS INDEX: 27.3 KG/M2 | WEIGHT: 195 LBS | HEART RATE: 68 BPM

## 2023-07-17 DIAGNOSIS — M54.50 BILATERAL LOW BACK PAIN WITHOUT SCIATICA, UNSPECIFIED CHRONICITY: Primary | ICD-10-CM

## 2023-07-17 PROCEDURE — 99283 EMERGENCY DEPT VISIT LOW MDM: CPT

## 2023-07-17 RX ORDER — METHOCARBAMOL 500 MG/1
750 TABLET, FILM COATED ORAL 3 TIMES DAILY
Qty: 15 TABLET | Refills: 0 | Status: SHIPPED | OUTPATIENT
Start: 2023-07-17 | End: 2023-07-27

## 2023-07-17 RX ORDER — NAPROXEN 500 MG/1
500 TABLET ORAL 2 TIMES DAILY WITH MEALS
Qty: 20 TABLET | Refills: 0 | Status: SHIPPED | OUTPATIENT
Start: 2023-07-17

## 2023-07-17 ASSESSMENT — PAIN SCALES - GENERAL: PAINLEVEL_OUTOF10: 9

## 2023-07-17 ASSESSMENT — PAIN DESCRIPTION - LOCATION: LOCATION: BACK

## 2023-07-17 ASSESSMENT — ENCOUNTER SYMPTOMS
DIARRHEA: 0
RHINORRHEA: 0
COLOR CHANGE: 0
BACK PAIN: 1
SHORTNESS OF BREATH: 0
VOMITING: 0
ABDOMINAL PAIN: 0

## 2023-07-17 ASSESSMENT — PAIN DESCRIPTION - ORIENTATION: ORIENTATION: LOWER

## 2023-07-17 ASSESSMENT — PAIN DESCRIPTION - PAIN TYPE: TYPE: ACUTE PAIN

## 2023-07-17 ASSESSMENT — PAIN DESCRIPTION - DESCRIPTORS: DESCRIPTORS: ACHING

## 2023-07-17 ASSESSMENT — PAIN DESCRIPTION - FREQUENCY: FREQUENCY: INTERMITTENT

## 2023-07-17 ASSESSMENT — PAIN - FUNCTIONAL ASSESSMENT: PAIN_FUNCTIONAL_ASSESSMENT: 0-10

## 2023-07-17 NOTE — ED PROVIDER NOTES
EMERGENCY DEPARTMENT HISTORY AND PHYSICAL EXAM      Date: 7/17/2023  Patient Name: Alanna Lema    History of Presenting Illness     Chief Complaint   Patient presents with    Back Pain       History (Context): Alanna Lema is a 57 y.o. male with significant past medical history of DM, asthma presents ambulatory the ED today. Patient reports gradually worsening back pain for the past week and a half. Patient reports he works in the [employer] and at [employer], often lifting heavy items. Patient reports history of persistent back pain likely due to heavy lifting. Pt has been taking OTC medication without relief of symptoms. Patient reports today the symptoms are a bit worse when he woke up. Denies blunt trauma or injury. Denies numbness, tingling, radiating pain weakness, saddle anesthesia, loss of bowel or bladder function. Denies urinary symptoms. PCP: None None    No current facility-administered medications for this encounter. Current Outpatient Medications   Medication Sig Dispense Refill    methocarbamol (ROBAXIN) 500 MG tablet Take 1.5 tablets by mouth 3 times daily for 10 days 15 tablet 0    naproxen (NAPROSYN) 500 MG tablet Take 1 tablet by mouth 2 times daily (with meals) 20 tablet 0    ibuprofen (ADVIL;MOTRIN) 600 MG tablet Take 1 tablet by mouth 4 times daily as needed for Pain 20 tablet 1       Past History     Past Medical History:   Past Medical History:   Diagnosis Date    Asthma     Diabetes ([address])        Past Surgical History:  No past surgical history on file. Family History:  No family history on file. Social History:   Social History     Tobacco Use    Smoking status: Former    Smokeless tobacco: Never   Substance Use Topics    Alcohol use: Yes    Drug use: No       Allergies: Allergies   Allergen Reactions    Shellfish Allergy Hives       PMH, PSH, family history, social history, allergies reviewed with the patient with significant items noted above.   Review

## 2023-07-17 NOTE — ED NOTES
Discharge instructions reviewed with patient. Patient verbalized understanding. Patient advised to follow up as directed on discharge instructions. Patient denies questions, needs or concerns at this time. Patient verbalized understanding. No s/sx of distress noted.        Teresa Willams RN  07/17/23 4858

## 2023-07-17 NOTE — ED NOTES
Pt states that he has had gradually worsening back pain over the last week and a half. 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 He was seen in the ED 4 days ago for the same.      Yolanda Stewart RN  07/17/23 0108

## 2023-07-18 ENCOUNTER — HOSPITAL ENCOUNTER (EMERGENCY)
Facility: HOSPITAL | Age: 48
Discharge: HOME | End: 2023-07-18
Attending: STUDENT IN AN ORGANIZED HEALTH CARE EDUCATION/TRAINING PROGRAM
Payer: COMMERCIAL

## 2023-07-18 VITALS
OXYGEN SATURATION: 100 % | HEART RATE: 65 BPM | RESPIRATION RATE: 18 BRPM | SYSTOLIC BLOOD PRESSURE: 144 MMHG | DIASTOLIC BLOOD PRESSURE: 70 MMHG | TEMPERATURE: 97.6 F

## 2023-07-18 DIAGNOSIS — M54.50 ACUTE MIDLINE LOW BACK PAIN WITHOUT SCIATICA: Primary | ICD-10-CM

## 2023-07-18 PROCEDURE — 99283 EMERGENCY DEPT VISIT LOW MDM: CPT

## 2023-07-18 RX ORDER — METHYLPREDNISOLONE 4 MG/1
TABLET ORAL
Qty: 1 KIT | Refills: 0 | Status: SHIPPED | OUTPATIENT
Start: 2023-07-18 | End: 2023-07-24

## 2023-07-18 ASSESSMENT — ENCOUNTER SYMPTOMS
BACK PAIN: 1
RESPIRATORY NEGATIVE: 1
GASTROINTESTINAL NEGATIVE: 1
EYES NEGATIVE: 1

## 2023-07-18 NOTE — ED TRIAGE NOTES
Pt  was seen here b7/13 and 7/18 for c/o lower back pain. Back today wanting a CT scan. Has not made a follow-up appt with spine doctor.  Pt ambulated to room with steady gait

## 2023-07-18 NOTE — DISCHARGE INSTRUCTIONS
Lots of heat and stretching. Take naproxen and Robaxin as directed for pain. Take medication as prescribed. Follow-up with your primary care physician within 2 days for reassessment. Bring the results from this visit with you for their review. Return to the ED immediately for any new, worsening, or persistent symptoms, including fever, vomiting, or any other medical concerns.

## 2023-07-18 NOTE — ED PROVIDER NOTES
EMERGENCY DEPARTMENT HISTORY AND PHYSICAL EXAM    5:21 PM      Date: 7/18/2023  Patient Name: Lyle Broderick    History of Presenting Illness     Chief Complaint   Patient presents with    Back Pain         History Provided By: Patient and medical chart review. Additional History (Context): Lyle Broderick is a 57 y.o. male with   Past Medical History:   Diagnosis Date    Asthma     Diabetes ([address] [facility])    who presents with complaints of back pain for the past 5 days. Currently rating back pain 7 out of 10. States it is to his mid lower back it is a nagging achy pain that comes and goes. States he works at the [employer] during the day and at night on [employer] so does lots of heavy lifting. Denies any injuries. Requesting work note. Requesting CT scan. Patient states tried heat and Aleve with minimal relief was given Robaxin prescription has not yet picked up. Patient denies any alcohol use. Denies any IV drug use. Patient was seen in the ER for same complaints of low back pain on July 17 as well as July 13. Patient denies any numbness tingling in hands or feet. Denies any urinary symptoms such as urinary frequency odor or burning. No chest pain or shortness of breath. Patient is afebrile. Follow-up with PCP or spine doctor. PCP: None None    No current facility-administered medications for this encounter. Current Outpatient Medications   Medication Sig Dispense Refill    methylPREDNISolone (MEDROL DOSEPACK) 4 MG tablet Take by mouth. 1 kit 0    methocarbamol (ROBAXIN) 500 MG tablet Take 1.5 tablets by mouth 3 times daily for 10 days 15 tablet 0    naproxen (NAPROSYN) 500 MG tablet Take 1 tablet by mouth 2 times daily (with meals) 20 tablet 0       Past History     Past Medical History:  Past Medical History:   Diagnosis Date    Asthma     Diabetes ([address] [facility])        Past Surgical History:  History reviewed. No pertinent surgical history. Family History:  History reviewed.  No pertinent family

## 2023-07-18 NOTE — ED NOTES
Discharge instructions given to patient. Follow up information provided. Prescriptions called to pharmacy per provider.       Tyrone Cool RN  07/18/23 1527

## 2023-08-08 ENCOUNTER — HOSPITAL ENCOUNTER (EMERGENCY)
Facility: HOSPITAL | Age: 48
Discharge: HOME | End: 2023-08-08
Attending: EMERGENCY MEDICINE
Payer: COMMERCIAL

## 2023-08-08 VITALS
HEART RATE: 69 BPM | OXYGEN SATURATION: 99 % | TEMPERATURE: 97.9 F | RESPIRATION RATE: 20 BRPM | SYSTOLIC BLOOD PRESSURE: 136 MMHG | HEIGHT: 71 IN | BODY MASS INDEX: 25.9 KG/M2 | WEIGHT: 185 LBS | DIASTOLIC BLOOD PRESSURE: 87 MMHG

## 2023-08-08 DIAGNOSIS — R42 LIGHTHEADEDNESS: Primary | ICD-10-CM

## 2023-08-08 DIAGNOSIS — R73.9 HYPERGLYCEMIA: ICD-10-CM

## 2023-08-08 LAB
ANION GAP SERPL CALC-SCNC: 4 MMOL/L (ref 3–18)
BASOPHILS # BLD: 0 K/UL (ref 0–0.1)
BASOPHILS NFR BLD: 0 % (ref 0–2)
BUN SERPL-MCNC: 11 MG/DL (ref 7–18)
BUN/CREAT SERPL: 10 (ref 12–20)
CALCIUM SERPL-MCNC: 8.5 MG/DL (ref 8.5–10.1)
CHLORIDE SERPL-SCNC: 106 MMOL/L (ref 100–111)
CO2 SERPL-SCNC: 29 MMOL/L (ref 21–32)
CREAT SERPL-MCNC: 1.13 MG/DL (ref 0.6–1.3)
DIFFERENTIAL METHOD BLD: ABNORMAL
EOSINOPHIL # BLD: 0.1 K/UL (ref 0–0.4)
EOSINOPHIL NFR BLD: 2 % (ref 0–5)
ERYTHROCYTE [DISTWIDTH] IN BLOOD BY AUTOMATED COUNT: 13.7 % (ref 11.6–14.5)
GLUCOSE SERPL-MCNC: 293 MG/DL (ref 74–99)
HCT VFR BLD AUTO: 34.9 % (ref 36–48)
HGB BLD-MCNC: 12.1 G/DL (ref 13–16)
IMM GRANULOCYTES # BLD AUTO: 0 K/UL (ref 0–0.04)
IMM GRANULOCYTES NFR BLD AUTO: 1 % (ref 0–0.5)
LYMPHOCYTES # BLD: 2.7 K/UL (ref 0.9–3.6)
LYMPHOCYTES NFR BLD: 45 % (ref 21–52)
MCH RBC QN AUTO: 29.2 PG (ref 24–34)
MCHC RBC AUTO-ENTMCNC: 34.7 G/DL (ref 31–37)
MCV RBC AUTO: 84.3 FL (ref 78–100)
MONOCYTES # BLD: 0.6 K/UL (ref 0.05–1.2)
MONOCYTES NFR BLD: 10 % (ref 3–10)
NEUTS SEG # BLD: 2.5 K/UL (ref 1.8–8)
NEUTS SEG NFR BLD: 42 % (ref 40–73)
NRBC # BLD: 0 K/UL (ref 0–0.01)
NRBC BLD-RTO: 0 PER 100 WBC
PLATELET # BLD AUTO: 202 K/UL (ref 135–420)
PMV BLD AUTO: 10.8 FL (ref 9.2–11.8)
POTASSIUM SERPL-SCNC: 3.7 MMOL/L (ref 3.5–5.5)
RBC # BLD AUTO: 4.14 M/UL (ref 4.35–5.65)
SODIUM SERPL-SCNC: 139 MMOL/L (ref 136–145)
TROPONIN I SERPL HS-MCNC: 5 NG/L (ref 0–78)
WBC # BLD AUTO: 6 K/UL (ref 4.6–13.2)

## 2023-08-08 PROCEDURE — 93005 ELECTROCARDIOGRAM TRACING: CPT | Performed by: EMERGENCY MEDICINE

## 2023-08-08 PROCEDURE — 99284 EMERGENCY DEPT VISIT MOD MDM: CPT

## 2023-08-08 PROCEDURE — 80048 BASIC METABOLIC PNL TOTAL CA: CPT

## 2023-08-08 PROCEDURE — 84484 ASSAY OF TROPONIN QUANT: CPT

## 2023-08-08 PROCEDURE — 85025 COMPLETE CBC W/AUTO DIFF WBC: CPT

## 2023-08-08 ASSESSMENT — PAIN - FUNCTIONAL ASSESSMENT: PAIN_FUNCTIONAL_ASSESSMENT: NONE - DENIES PAIN

## 2023-08-08 NOTE — ED TRIAGE NOTES
Pt here for evaluation of episode of dizziness/lightheadedness ~10 minutes in duration when leaving for work this evening. Pt denies any headache, chest pain, or other symptoms. Pt states dizziness resolved on its own.

## 2023-08-08 NOTE — ED NOTES
Pt in NAD, Discussed DC with the patient and all questions fully answered. Pt denies concerns at this time.         Gladys Moreno RN  08/08/23 2490

## 2023-08-08 NOTE — DISCHARGE INSTRUCTIONS
Return for pain, fever not resolving with motrin or tylenol, shortness of breath, vomiting, decreased fluid intake, weakness, numbness, dizziness, or any change or concerns. Was the patient seen in the last year in this department? Yes    Does patient have an active prescription for medications requested? No     Received Request Via: Pharmacy

## 2023-08-09 LAB
EKG ATRIAL RATE: 72 BPM
EKG DIAGNOSIS: NORMAL
EKG P AXIS: 69 DEGREES
EKG P-R INTERVAL: 182 MS
EKG Q-T INTERVAL: 416 MS
EKG QRS DURATION: 90 MS
EKG QTC CALCULATION (BAZETT): 455 MS
EKG R AXIS: 7 DEGREES
EKG T AXIS: 15 DEGREES
EKG VENTRICULAR RATE: 72 BPM

## 2023-08-09 PROCEDURE — 93010 ELECTROCARDIOGRAM REPORT: CPT | Performed by: INTERNAL MEDICINE

## 2023-08-22 ENCOUNTER — HOSPITAL ENCOUNTER (EMERGENCY)
Facility: HOSPITAL | Age: 48
Discharge: HOME | End: 2023-08-22
Attending: EMERGENCY MEDICINE
Payer: COMMERCIAL

## 2023-08-22 VITALS
SYSTOLIC BLOOD PRESSURE: 128 MMHG | HEIGHT: 70 IN | TEMPERATURE: 98 F | WEIGHT: 190 LBS | HEART RATE: 58 BPM | BODY MASS INDEX: 27.2 KG/M2 | RESPIRATION RATE: 18 BRPM | DIASTOLIC BLOOD PRESSURE: 77 MMHG | OXYGEN SATURATION: 100 %

## 2023-08-22 DIAGNOSIS — S16.1XXA ACUTE STRAIN OF NECK MUSCLE, INITIAL ENCOUNTER: Primary | ICD-10-CM

## 2023-08-22 PROCEDURE — 99283 EMERGENCY DEPT VISIT LOW MDM: CPT

## 2023-08-22 RX ORDER — KETOROLAC TROMETHAMINE 10 MG/1
10 TABLET, FILM COATED ORAL EVERY 6 HOURS PRN
Qty: 20 TABLET | Refills: 0 | Status: SHIPPED | OUTPATIENT
Start: 2023-08-22

## 2023-08-22 RX ORDER — ACETAMINOPHEN 325 MG/1
650 TABLET ORAL EVERY 6 HOURS PRN
Qty: 120 TABLET | Refills: 3 | Status: SHIPPED | OUTPATIENT
Start: 2023-08-22

## 2023-08-22 RX ORDER — METHOCARBAMOL 750 MG/1
750 TABLET, FILM COATED ORAL 3 TIMES DAILY
Qty: 20 TABLET | Refills: 0 | Status: SHIPPED | OUTPATIENT
Start: 2023-08-22

## 2023-08-22 ASSESSMENT — PAIN - FUNCTIONAL ASSESSMENT: PAIN_FUNCTIONAL_ASSESSMENT: 0-10

## 2023-08-22 ASSESSMENT — ENCOUNTER SYMPTOMS
SHORTNESS OF BREATH: 0
SORE THROAT: 0
COUGH: 0
NAUSEA: 0
DIARRHEA: 0
EYE DISCHARGE: 0
ABDOMINAL PAIN: 0

## 2023-08-22 ASSESSMENT — PAIN DESCRIPTION - LOCATION: LOCATION: NECK

## 2023-08-22 ASSESSMENT — PAIN SCALES - GENERAL: PAINLEVEL_OUTOF10: 5

## 2023-08-22 ASSESSMENT — PAIN DESCRIPTION - PAIN TYPE: TYPE: ACUTE PAIN

## 2023-08-22 NOTE — ED PROVIDER NOTES
[phone]   acetaminophen 325 MG tablet  ketorolac 10 MG tablet  methocarbamol 750 MG tablet             Diagnosis     Clinical Impression:   1. Acute strain of neck muscle, initial encounter          \"Please note that this dictation was completed with Munchkin, the computer voice recognition software. Quite often unanticipated grammatical, syntax, homophones, and other interpretive errors are inadvertently transcribed by the computer software. Please disregard these errors. Please excuse any errors that have escaped final proofreading. \"     Rina Mai, [address] [place] [address]  08/22/23 5572

## 2023-08-22 NOTE — ED NOTES
Discharge instructions reviewed with patient. Patient verbalized understanding. Patient advised to follow up as directed on discharge instructions. Patient denies questions, needs or concerns at this time. Patient verbalized understanding. No s/sx of distress noted.         Karthik Peters RN  08/22/23 3559

## 2023-11-08 ENCOUNTER — HOSPITAL ENCOUNTER (EMERGENCY)
Facility: HOSPITAL | Age: 48
Discharge: HOME | End: 2023-11-08
Payer: COMMERCIAL

## 2023-11-08 VITALS
SYSTOLIC BLOOD PRESSURE: 132 MMHG | HEIGHT: 71 IN | RESPIRATION RATE: 16 BRPM | OXYGEN SATURATION: 100 % | HEART RATE: 68 BPM | WEIGHT: 185 LBS | BODY MASS INDEX: 25.9 KG/M2 | TEMPERATURE: 98.1 F | DIASTOLIC BLOOD PRESSURE: 76 MMHG

## 2023-11-08 DIAGNOSIS — M43.6 TORTICOLLIS: Primary | ICD-10-CM

## 2023-11-08 PROCEDURE — 99282 EMERGENCY DEPT VISIT SF MDM: CPT

## 2023-11-08 ASSESSMENT — PAIN SCALES - GENERAL: PAINLEVEL_OUTOF10: 6

## 2023-11-08 ASSESSMENT — PAIN - FUNCTIONAL ASSESSMENT: PAIN_FUNCTIONAL_ASSESSMENT: 0-10

## 2023-11-08 ASSESSMENT — ENCOUNTER SYMPTOMS: PHOTOPHOBIA: 0

## 2023-11-08 NOTE — DISCHARGE INSTRUCTIONS
May take Tylenol Motrin as needed for pain. Use lots of heat and stretching. Follow-up with your primary care physician within 2 days for reassessment. Bring the results from this visit with you for their review. Return to the ED immediately for any new, worsening, or persistent symptoms, including fever, vomiting, chest pain, shortness of breath, or any other medical concerns.

## 2023-11-08 NOTE — ED PROVIDER NOTES
EMERGENCY DEPARTMENT HISTORY AND PHYSICAL EXAM    6:52 PM      Date: 11/8/2023  Patient Name: Israel Burroughs    History of Presenting Illness     Chief Complaint   Patient presents with    Neck Pain         History Provided By: Patient and medical chart review. Additional History (Context): Israel Burroughs is a 57 y.o. male with   Past Medical History:   Diagnosis Date    Asthma     Diabetes ([address])    who presents with  complaints of right-sided neck pain after waking up from a nap today. States he called out of work and they made him come into the ER for a work note. Patient denies any injury or trauma. Denies any fevers. Denies any headaches or visual changes. States is just a little sore to the right side rating pain 4 out of 10. Has not taken any medications to alleviate any of his pain. Requesting work note for [employer] PCP: None, None    No current facility-administered medications for this encounter. Current Outpatient Medications   Medication Sig Dispense Refill    methocarbamol (ROBAXIN-750) 750 MG tablet Take 1 tablet by mouth 3 times daily 20 tablet 0    ketorolac (TORADOL) 10 MG tablet Take 1 tablet by mouth every 6 hours as needed for Pain 20 tablet 0    acetaminophen (AMINOFEN) 325 MG tablet Take 2 tablets by mouth every 6 hours as needed for Pain 120 tablet 3    metFORMIN (GLUCOPHAGE) 500 MG tablet Take 1 tablet by mouth daily (with breakfast) for 14 days 14 tablet 0    cyclobenzaprine (FLEXERIL) 10 MG tablet Take 1 tablet by mouth 3 times daily as needed for Muscle spasms 15 tablet 0    lidocaine (LIDODERM) 5 % Place 1 patch onto the skin daily 12 hours on, 12 hours off. 15 patch 0    naproxen (NAPROSYN) 375 MG tablet Take 1 tablet by mouth 2 times daily (with meals) 20 tablet 0       Past History     Past Medical History:  Past Medical History:   Diagnosis Date    Asthma     Diabetes ([address])        Past Surgical History:  No past surgical history on file.     Family History:  No family

## 2023-11-22 ENCOUNTER — HOSPITAL ENCOUNTER (EMERGENCY)
Facility: HOSPITAL | Age: 48
Discharge: HOME | End: 2023-11-22
Attending: STUDENT IN AN ORGANIZED HEALTH CARE EDUCATION/TRAINING PROGRAM
Payer: COMMERCIAL

## 2023-11-22 VITALS
RESPIRATION RATE: 18 BRPM | OXYGEN SATURATION: 99 % | TEMPERATURE: 98.1 F | DIASTOLIC BLOOD PRESSURE: 72 MMHG | SYSTOLIC BLOOD PRESSURE: 120 MMHG | HEART RATE: 65 BPM

## 2023-11-22 DIAGNOSIS — M54.50 LOW BACK PAIN WITHOUT SCIATICA, UNSPECIFIED BACK PAIN LATERALITY, UNSPECIFIED CHRONICITY: Primary | ICD-10-CM

## 2023-11-22 PROCEDURE — 99283 EMERGENCY DEPT VISIT LOW MDM: CPT

## 2023-11-22 RX ORDER — NAPROXEN 375 MG/1
375 TABLET ORAL 2 TIMES DAILY WITH MEALS
Qty: 20 TABLET | Refills: 0 | Status: SHIPPED | OUTPATIENT
Start: 2023-11-22

## 2023-11-22 RX ORDER — LIDOCAINE 50 MG/G
1 PATCH TOPICAL DAILY
Qty: 15 PATCH | Refills: 0 | Status: SHIPPED | OUTPATIENT
Start: 2023-11-22

## 2023-11-22 RX ORDER — CYCLOBENZAPRINE HCL 10 MG
10 TABLET ORAL 3 TIMES DAILY PRN
Qty: 15 TABLET | Refills: 0 | Status: SHIPPED | OUTPATIENT
Start: 2023-11-22

## 2023-11-22 ASSESSMENT — ENCOUNTER SYMPTOMS
BACK PAIN: 1
NAUSEA: 0
SORE THROAT: 0
DIARRHEA: 0
VOMITING: 0
CHEST TIGHTNESS: 0
ABDOMINAL PAIN: 0
SHORTNESS OF BREATH: 0

## 2023-11-22 ASSESSMENT — PAIN SCALES - GENERAL: PAINLEVEL_OUTOF10: 7

## 2023-11-22 ASSESSMENT — PAIN DESCRIPTION - ORIENTATION: ORIENTATION: LOWER

## 2023-11-22 ASSESSMENT — PAIN - FUNCTIONAL ASSESSMENT: PAIN_FUNCTIONAL_ASSESSMENT: 0-10

## 2023-11-22 ASSESSMENT — PAIN DESCRIPTION - DESCRIPTORS: DESCRIPTORS: ACHING

## 2023-11-22 ASSESSMENT — PAIN DESCRIPTION - LOCATION: LOCATION: BACK

## 2023-11-22 NOTE — ED PROVIDER NOTES
EMERGENCY DEPARTMENT HISTORY AND PHYSICAL EXAM      Date: 11/22/2023  Patient Name: Spencer Garnica    History of Presenting Illness     Chief Complaint   Patient presents with    Back Pain       57year old male presenting to the emergency department for evaluation of lumbar back pain. Patient works at the [employer] on a daily basis. No trauma or injury that he recalls. Patient does wear back brace. He has had this issue previously and was supposed to follow-up with the spinal surgeon but states that he has been unable to do so and has to make another appointment. Denies any saddle anesthesia, motor weakness or sensory deficits to his lower extremities, denies any incontinence of stool or urine, fevers or chills. PCP: None, None    No current facility-administered medications for this encounter. Current Outpatient Medications   Medication Sig Dispense Refill    cyclobenzaprine (FLEXERIL) 10 MG tablet Take 1 tablet by mouth 3 times daily as needed for Muscle spasms 15 tablet 0    lidocaine (LIDODERM) 5 % Place 1 patch onto the skin daily 12 hours on, 12 hours off. 15 patch 0    naproxen (NAPROSYN) 375 MG tablet Take 1 tablet by mouth 2 times daily (with meals) 20 tablet 0    methocarbamol (ROBAXIN-750) 750 MG tablet Take 1 tablet by mouth 3 times daily 20 tablet 0    ketorolac (TORADOL) 10 MG tablet Take 1 tablet by mouth every 6 hours as needed for Pain 20 tablet 0    acetaminophen (AMINOFEN) 325 MG tablet Take 2 tablets by mouth every 6 hours as needed for Pain 120 tablet 3    metFORMIN (GLUCOPHAGE) 500 MG tablet Take 1 tablet by mouth daily (with breakfast) for 14 days 14 tablet 0       Past History     Past Medical History:  Past Medical History:   Diagnosis Date    Asthma     Diabetes ([address])        Past Surgical History:  No past surgical history on file. Family History:  No family history on file.     Social History:  Social History     Tobacco Use    Smoking

## 2023-11-22 NOTE — ED TRIAGE NOTES
Patient c/o lower back pain that started last night. Patient has not yet followed up with the spine doctor.

## 2023-12-09 ENCOUNTER — HOSPITAL ENCOUNTER (EMERGENCY)
Facility: HOSPITAL | Age: 48
Discharge: HOME | End: 2023-12-09
Attending: EMERGENCY MEDICINE
Payer: COMMERCIAL

## 2023-12-09 VITALS
BODY MASS INDEX: 26.6 KG/M2 | HEART RATE: 65 BPM | TEMPERATURE: 98.2 F | RESPIRATION RATE: 12 BRPM | WEIGHT: 190 LBS | SYSTOLIC BLOOD PRESSURE: 140 MMHG | HEIGHT: 71 IN | DIASTOLIC BLOOD PRESSURE: 89 MMHG | OXYGEN SATURATION: 100 %

## 2023-12-09 DIAGNOSIS — R51.9 ACUTE NONINTRACTABLE HEADACHE, UNSPECIFIED HEADACHE TYPE: Primary | ICD-10-CM

## 2023-12-09 PROCEDURE — 93005 ELECTROCARDIOGRAM TRACING: CPT | Performed by: EMERGENCY MEDICINE

## 2023-12-09 PROCEDURE — 6370000000 HC RX 637 (ALT 250 FOR IP): Performed by: EMERGENCY MEDICINE

## 2023-12-09 PROCEDURE — 99283 EMERGENCY DEPT VISIT LOW MDM: CPT

## 2023-12-09 RX ORDER — IBUPROFEN 600 MG/1
600 TABLET ORAL
Status: COMPLETED | OUTPATIENT
Start: 2023-12-09 | End: 2023-12-09

## 2023-12-09 RX ADMIN — IBUPROFEN 600 MG: 600 TABLET, FILM COATED ORAL at 18:22

## 2023-12-09 ASSESSMENT — PAIN SCALES - GENERAL: PAINLEVEL_OUTOF10: 4

## 2023-12-09 ASSESSMENT — PAIN - FUNCTIONAL ASSESSMENT: PAIN_FUNCTIONAL_ASSESSMENT: 0-10

## 2023-12-09 NOTE — ED PROVIDER NOTES
[facility] EMERGENCY DEPT  EMERGENCY DEPARTMENT ENCOUNTER    Patient Name: Aliyah Godoy  MRN: [id number]  YOB: 1975  Provider: Martha Venegas MD  PCP: None, None   Time/Date of evaluation: 6:14 PM EST on 12/9/23    History of Presenting Illness     History Provided by: Patient  History is limited by: Nothing     HISTORY:   Aliyah Godoy is a 57 y.o. male presenting with a headache around 4 or 10 in severity. Headache was not sudden onset. He describes a bandlike pressure-like headache. Denies any vision changes. Complains of dizziness that he describes as lightheadedness. He does not have any room spinning sensation. Does not have any neck pain. Denies any recent head trauma. This is very similar to previous tension headaches he has had before. Nursing Notes were all reviewed and agreed with or any disagreements were addressed in the HPI. Past History     PAST MEDICAL HISTORY:  Past Medical History:   Diagnosis Date    Asthma     Diabetes ([address])        PAST SURGICAL HISTORY:  History reviewed. No pertinent surgical history. FAMILY HISTORY:  History reviewed. No pertinent family history.     SOCIAL HISTORY:  Social History     Tobacco Use    Smoking status: Former    Smokeless tobacco: Never   Substance Use Topics    Alcohol use: Yes    Drug use: No       MEDICATIONS:  Current Facility-Administered Medications   Medication Dose Route Frequency Provider Last Rate Last Admin    ibuprofen (ADVIL;MOTRIN) tablet 600 mg  600 mg Oral NOW Martha Venegas MD         Current Outpatient Medications   Medication Sig Dispense Refill    cyclobenzaprine (FLEXERIL) 10 MG tablet Take 1 tablet by mouth 3 times daily as needed for Muscle spasms 15 tablet 0    lidocaine (LIDODERM) 5 % Place 1 patch onto the skin daily 12 hours on, 12 hours off. 15 patch 0    naproxen (NAPROSYN) 375 MG tablet Take 1 tablet by mouth 2 times daily (with meals) 20 tablet 0    methocarbamol (ROBAXIN-750) 750 MG tablet

## 2023-12-09 NOTE — ED NOTES
Discharge instructions reviewed with the patient. Patient in stable condition at discharge.       Edil Tang RN  12/09/23 2767

## 2023-12-10 LAB
EKG ATRIAL RATE: 64 BPM
EKG DIAGNOSIS: NORMAL
EKG P AXIS: 42 DEGREES
EKG P-R INTERVAL: 178 MS
EKG Q-T INTERVAL: 424 MS
EKG QRS DURATION: 88 MS
EKG QTC CALCULATION (BAZETT): 437 MS
EKG R AXIS: 14 DEGREES
EKG T AXIS: 5 DEGREES
EKG VENTRICULAR RATE: 64 BPM

## 2023-12-10 PROCEDURE — 93010 ELECTROCARDIOGRAM REPORT: CPT | Performed by: INTERNAL MEDICINE

## 2024-01-22 ENCOUNTER — HOSPITAL ENCOUNTER (EMERGENCY)
Facility: HOSPITAL | Age: 49
Discharge: HOME | End: 2024-01-22
Attending: EMERGENCY MEDICINE

## 2024-01-22 ENCOUNTER — APPOINTMENT (OUTPATIENT)
Facility: HOSPITAL | Age: 49
End: 2024-01-22

## 2024-01-22 VITALS
HEIGHT: 71 IN | SYSTOLIC BLOOD PRESSURE: 118 MMHG | HEART RATE: 66 BPM | BODY MASS INDEX: 25.9 KG/M2 | DIASTOLIC BLOOD PRESSURE: 72 MMHG | OXYGEN SATURATION: 99 % | TEMPERATURE: 98.2 F | RESPIRATION RATE: 16 BRPM | WEIGHT: 185 LBS

## 2024-01-22 DIAGNOSIS — R10.9 FLANK PAIN: Primary | ICD-10-CM

## 2024-01-22 LAB
ANION GAP SERPL CALC-SCNC: 3 MMOL/L (ref 3–18)
APPEARANCE UR: CLEAR
BASOPHILS # BLD: 0 K/UL (ref 0–0.1)
BASOPHILS NFR BLD: 0 % (ref 0–2)
BILIRUB UR QL: NEGATIVE
BUN SERPL-MCNC: 12 MG/DL (ref 7–18)
BUN/CREAT SERPL: 11 (ref 12–20)
CALCIUM SERPL-MCNC: 9.6 MG/DL (ref 8.5–10.1)
CHLORIDE SERPL-SCNC: 107 MMOL/L (ref 100–111)
CO2 SERPL-SCNC: 29 MMOL/L (ref 21–32)
COLOR UR: YELLOW
CREAT SERPL-MCNC: 1.09 MG/DL (ref 0.6–1.3)
DIFFERENTIAL METHOD BLD: NORMAL
EOSINOPHIL # BLD: 0.2 K/UL (ref 0–0.4)
EOSINOPHIL NFR BLD: 3 % (ref 0–5)
EPITH CASTS URNS QL MICRO: NORMAL /LPF (ref 0–5)
ERYTHROCYTE [DISTWIDTH] IN BLOOD BY AUTOMATED COUNT: 13.7 % (ref 11.6–14.5)
GLUCOSE SERPL-MCNC: 179 MG/DL (ref 74–99)
GLUCOSE UR STRIP.AUTO-MCNC: 100 MG/DL
HCT VFR BLD AUTO: 39.8 % (ref 36–48)
HGB BLD-MCNC: 13.4 G/DL (ref 13–16)
HGB UR QL STRIP: NEGATIVE
IMM GRANULOCYTES # BLD AUTO: 0 K/UL (ref 0–0.04)
IMM GRANULOCYTES NFR BLD AUTO: 0 % (ref 0–0.5)
KETONES UR QL STRIP.AUTO: ABNORMAL MG/DL
LEUKOCYTE ESTERASE UR QL STRIP.AUTO: ABNORMAL
LYMPHOCYTES # BLD: 2.3 K/UL (ref 0.9–3.6)
LYMPHOCYTES NFR BLD: 40 % (ref 21–52)
MCH RBC QN AUTO: 27.7 PG (ref 24–34)
MCHC RBC AUTO-ENTMCNC: 33.7 G/DL (ref 31–37)
MCV RBC AUTO: 82.2 FL (ref 78–100)
MONOCYTES # BLD: 0.5 K/UL (ref 0.05–1.2)
MONOCYTES NFR BLD: 8 % (ref 3–10)
NEUTS SEG # BLD: 2.8 K/UL (ref 1.8–8)
NEUTS SEG NFR BLD: 49 % (ref 40–73)
NITRITE UR QL STRIP.AUTO: NEGATIVE
NRBC # BLD: 0 K/UL (ref 0–0.01)
NRBC BLD-RTO: 0 PER 100 WBC
PH UR STRIP: 6 (ref 5–8)
PLATELET # BLD AUTO: 206 K/UL (ref 135–420)
PMV BLD AUTO: 9.7 FL (ref 9.2–11.8)
POTASSIUM SERPL-SCNC: 3.7 MMOL/L (ref 3.5–5.5)
PROT UR STRIP-MCNC: NEGATIVE MG/DL
RBC # BLD AUTO: 4.84 M/UL (ref 4.35–5.65)
RBC #/AREA URNS HPF: NORMAL /HPF (ref 0–5)
SODIUM SERPL-SCNC: 139 MMOL/L (ref 136–145)
SP GR UR REFRACTOMETRY: 1.02 (ref 1–1.03)
UROBILINOGEN UR QL STRIP.AUTO: 1 EU/DL (ref 0.2–1)
WBC # BLD AUTO: 5.7 K/UL (ref 4.6–13.2)
WBC URNS QL MICRO: NORMAL /HPF (ref 0–4)

## 2024-01-22 PROCEDURE — 85025 COMPLETE CBC W/AUTO DIFF WBC: CPT

## 2024-01-22 PROCEDURE — 96374 THER/PROPH/DIAG INJ IV PUSH: CPT

## 2024-01-22 PROCEDURE — 6360000002 HC RX W HCPCS: Performed by: EMERGENCY MEDICINE

## 2024-01-22 PROCEDURE — 74176 CT ABD & PELVIS W/O CONTRAST: CPT

## 2024-01-22 PROCEDURE — 99284 EMERGENCY DEPT VISIT MOD MDM: CPT

## 2024-01-22 PROCEDURE — 80048 BASIC METABOLIC PNL TOTAL CA: CPT

## 2024-01-22 PROCEDURE — 81001 URINALYSIS AUTO W/SCOPE: CPT

## 2024-01-22 RX ORDER — IBUPROFEN 600 MG/1
600 TABLET ORAL EVERY 6 HOURS PRN
Qty: 16 TABLET | Refills: 0 | Status: SHIPPED | OUTPATIENT
Start: 2024-01-22 | End: 2024-02-07

## 2024-01-22 RX ORDER — KETOROLAC TROMETHAMINE 15 MG/ML
15 INJECTION, SOLUTION INTRAMUSCULAR; INTRAVENOUS ONCE
Status: COMPLETED | OUTPATIENT
Start: 2024-01-22 | End: 2024-01-22

## 2024-01-22 RX ADMIN — KETOROLAC TROMETHAMINE 15 MG: 15 INJECTION, SOLUTION INTRAMUSCULAR; INTRAVENOUS at 15:09

## 2024-01-22 ASSESSMENT — PAIN SCALES - GENERAL
PAINLEVEL_OUTOF10: 5
PAINLEVEL_OUTOF10: 2

## 2024-01-22 ASSESSMENT — PAIN DESCRIPTION - LOCATION: LOCATION: FLANK

## 2024-01-22 ASSESSMENT — PAIN - FUNCTIONAL ASSESSMENT: PAIN_FUNCTIONAL_ASSESSMENT: 0-10

## 2024-01-22 ASSESSMENT — PAIN DESCRIPTION - DESCRIPTORS: DESCRIPTORS: ACHING

## 2024-01-22 ASSESSMENT — PAIN DESCRIPTION - ORIENTATION: ORIENTATION: RIGHT

## 2024-01-22 NOTE — ED PROVIDER NOTES
11 (*)     All other components within normal limits   URINALYSIS - Abnormal; Notable for the following components:    Glucose,  (*)     Ketones, Urine TRACE (*)     Leukocyte Esterase, Urine TRACE (*)     All other components within normal limits   CBC WITH AUTO DIFFERENTIAL   URINALYSIS, MICRO       All other labs were within normal range or not returned as of thisdictation.      IMAGING  CT ABDOMEN PELVIS WO CONTRAST Additional Contrast? None   Final Result      No acute abnormality in the abdomen or pelvis. No renal or ureteral stones. No   hydronephrosis.                 No results found.   No results found.       ED Course as of 01/22/24 1621   Mon Jan 22, 2024   1618 Labs and imaging are unremarkable.  No evidence of kidney stone or UTI.  Likely musculoskeletal etiology to be treated with ibuprofen and topical medications.  Primary care follow-up []   1619 Patient feeling better after Toradol.  Requesting note for work for today.  That is provided []      ED Course User Index  [] Mahin Pillai MD       PROCEDURES   Unless otherwise noted below, none     Procedures    CRITICAL CARE TIME   N/A    CONSULTS:  None    EMERGENCY DEPARTMENT COURSE and DIFFERENTIAL DIAGNOSIS/MDM:   Vitals:    Vitals:    01/22/24 1447 01/22/24 1600   BP: 134/81 118/72   Pulse: 71 66   Resp: 18 16   Temp: 98.2 °F (36.8 °C)    TempSrc: Tympanic    SpO2: 99% 99%   Weight: 83.9 kg (185 lb)    Height: 1.803 m (5' 11\")        Patient was given the following medications:  Medications   ketorolac (TORADOL) injection 15 mg (15 mg IntraVENous Given 1/22/24 1509)            The patient tolerated their visit well.   Thepatient and / or the family were informed of the results of any tests, a time was given to answer questions.    I am the Primary Clinician of Record.    FINAL IMPRESSION      1. Flank pain        DISPOSITION/PLAN   DISPOSITION Decision To Discharge 01/22/2024 04:20:32 PM      PATIENT REFERRED TO:  Your primary care

## 2024-01-22 NOTE — DISCHARGE INSTRUCTIONS
As discussed, I suspect your symptoms are musculoskeletal in nature.  Take ibuprofen as needed for your pain.  Consider the use of a topical medication such as IcyHot or capsaicin.  Moist heat and range of motion exercises will help in your recovery.  Arrange follow-up with primary care in the coming week for reassessment.  Return for any acute concerns

## 2024-02-14 ENCOUNTER — HOSPITAL ENCOUNTER (EMERGENCY)
Facility: HOSPITAL | Age: 49
Discharge: HOME | End: 2024-02-14

## 2024-02-14 VITALS
BODY MASS INDEX: 25.9 KG/M2 | SYSTOLIC BLOOD PRESSURE: 122 MMHG | OXYGEN SATURATION: 100 % | WEIGHT: 185 LBS | HEART RATE: 70 BPM | TEMPERATURE: 98.4 F | DIASTOLIC BLOOD PRESSURE: 88 MMHG | HEIGHT: 71 IN | RESPIRATION RATE: 14 BRPM

## 2024-02-14 DIAGNOSIS — R51.9 NONINTRACTABLE HEADACHE, UNSPECIFIED CHRONICITY PATTERN, UNSPECIFIED HEADACHE TYPE: Primary | ICD-10-CM

## 2024-02-14 PROCEDURE — 99282 EMERGENCY DEPT VISIT SF MDM: CPT

## 2024-02-14 NOTE — DISCHARGE INSTRUCTIONS
Return to emergency department if any worsening symptoms related to headache especially unsteady feeling on her feet, neck pain, altered level of consciousness, numbness or tingling in face or arms    Take ibuprofen and Tylenol as needed for headache, some people find that caffeine also helps with headaches

## 2024-02-14 NOTE — ED PROVIDER NOTES
EMERGENCY DEPARTMENT HISTORY AND PHYSICAL EXAM        Date: 2/14/2024  Patient Name: Derrick Coronado    History of Presenting Illness     Chief Complaint   Patient presents with    Headache     headache       History Provided By: History obtained from patient    HPI: Derrick Coronado, 49 y.o. male presents to the ED with cc of headache today    Patient was driving his car to work when he felt headache onset. It gradually increased in severity to 7/10, frontal location, no associated neuro deficits. He has headaches before, no migraines. No provocation by exercise, sudden intensity at onset, no meningeal signs, no trauma, no vomiting, and no neurological deficits.    No nausea, vomiting, diarrhea, fever, chills, chest pain, shortness of breath, leg swelling     There are no other complaints, changes, or physical findings at this time.    Records Reviewed: none    PCP: None, None    No current facility-administered medications on file prior to encounter.     Current Outpatient Medications on File Prior to Encounter   Medication Sig Dispense Refill    ibuprofen (ADVIL;MOTRIN) 600 MG tablet Take 1 tablet by mouth every 6 hours as needed for Pain 16 tablet 0    cyclobenzaprine (FLEXERIL) 10 MG tablet Take 1 tablet by mouth 3 times daily as needed for Muscle spasms (Patient not taking: Reported on 2/14/2024) 15 tablet 0    lidocaine (LIDODERM) 5 % Place 1 patch onto the skin daily 12 hours on, 12 hours off. (Patient not taking: Reported on 2/14/2024) 15 patch 0    naproxen (NAPROSYN) 375 MG tablet Take 1 tablet by mouth 2 times daily (with meals) (Patient not taking: Reported on 2/14/2024) 20 tablet 0    methocarbamol (ROBAXIN-750) 750 MG tablet Take 1 tablet by mouth 3 times daily (Patient not taking: Reported on 2/14/2024) 20 tablet 0    ketorolac (TORADOL) 10 MG tablet Take 1 tablet by mouth every 6 hours as needed for Pain (Patient not taking: Reported on 2/14/2024) 20 tablet 0    acetaminophen (AMINOFEN) 325 MG tablet

## 2024-02-27 ENCOUNTER — HOSPITAL ENCOUNTER (EMERGENCY)
Facility: HOSPITAL | Age: 49
Discharge: HOME | End: 2024-02-27
Payer: COMMERCIAL

## 2024-02-27 VITALS
HEART RATE: 74 BPM | SYSTOLIC BLOOD PRESSURE: 116 MMHG | DIASTOLIC BLOOD PRESSURE: 61 MMHG | WEIGHT: 185 LBS | BODY MASS INDEX: 25.9 KG/M2 | TEMPERATURE: 97.5 F | HEIGHT: 71 IN | RESPIRATION RATE: 20 BRPM | OXYGEN SATURATION: 98 %

## 2024-02-27 DIAGNOSIS — M54.50 ACUTE BILATERAL LOW BACK PAIN WITHOUT SCIATICA: Primary | ICD-10-CM

## 2024-02-27 PROCEDURE — 99283 EMERGENCY DEPT VISIT LOW MDM: CPT

## 2024-02-27 RX ORDER — LIDOCAINE 50 MG/G
1 PATCH TOPICAL DAILY
Qty: 30 PATCH | Refills: 0 | Status: SHIPPED | OUTPATIENT
Start: 2024-02-27

## 2024-02-27 RX ORDER — IBUPROFEN 600 MG/1
600 TABLET ORAL EVERY 6 HOURS PRN
Qty: 30 TABLET | Refills: 0 | Status: SHIPPED | OUTPATIENT
Start: 2024-02-27 | End: 2024-03-14

## 2024-02-27 ASSESSMENT — PAIN - FUNCTIONAL ASSESSMENT: PAIN_FUNCTIONAL_ASSESSMENT: 0-10

## 2024-02-27 ASSESSMENT — PAIN SCALES - GENERAL: PAINLEVEL_OUTOF10: 5

## 2024-02-27 ASSESSMENT — LIFESTYLE VARIABLES
HOW MANY STANDARD DRINKS CONTAINING ALCOHOL DO YOU HAVE ON A TYPICAL DAY: 1 OR 2
HOW OFTEN DO YOU HAVE A DRINK CONTAINING ALCOHOL: MONTHLY OR LESS

## 2024-02-27 ASSESSMENT — ENCOUNTER SYMPTOMS: BACK PAIN: 1

## 2024-02-27 NOTE — ED TRIAGE NOTES
Pt c/o lower back pain that started while working last night. States lidocaine patches work for him

## 2024-02-27 NOTE — ED NOTES
Discharge instructions given to patient. Follow up information provided, verbalized understanding.

## 2024-02-27 NOTE — ED PROVIDER NOTES
were reviewed with the patient. All of pt's questions and concerns were addressed.  Alarm symptoms and return precautions associated with chief complaint and evaluation were reviewed with the patient in detail.  The patient demonstrated adequate understanding.  Patient was instructed to follow up with PCP in 2 days for reassessment, as well as given strict return precautions to the ED upon further deterioration. Patient is ready for discharge home.    Diagnosis     Clinical Impression:   1. Acute bilateral low back pain without sciatica        Disposition: home      [facility] EMERGENCY DEPT  [address] [facility] [address] [phone]    If symptoms worsen          Medication List        CONTINUE taking these medications      ibuprofen 600 MG tablet  Commonly known as: ADVIL;MOTRIN  Take 1 tablet by mouth every 6 hours as needed for Pain     lidocaine 5 %  Commonly known as: LIDODERM  Place 1 patch onto the skin daily 12 hours on, 12 hours off.            ASK your doctor about these medications      metFORMIN 500 MG tablet  Commonly known as: GLUCOPHAGE  Take 1 tablet by mouth daily (with breakfast) for 14 days               Where to Get Your Medications        These medications were sent to [facility] [address] [place] [address] -  [phone] - F [phone]  [address] [place] [id number]      Phone: [phone]   ibuprofen 600 MG tablet  lidocaine 5 %          Dictation disclaimer:  Please note that this dictation was completed with [employer] the [employer] voice recognition software.  Quite often unanticipated grammatical, syntax, homophones, and other interpretive errors are inadvertently transcribed by the computer software.  Please disregard these errors.  Please excuse any errors that have escaped final proofreading.         Matilde Fernandez APRN - NP  02/27/24 3790

## 2024-02-27 NOTE — DISCHARGE INSTRUCTIONS
Use lots of heat and stretching.  Take medication as prescribed. Follow-up with your primary care physician within 2 days for reassessment. Bring the results from this visit with you for their review. Return to the ED immediately for any new, worsening, or persistent symptoms, including fever, vomiting, chest pain, shortness of breath, or any other medical concerns.

## 2024-03-11 ENCOUNTER — HOSPITAL ENCOUNTER (EMERGENCY)
Facility: HOSPITAL | Age: 49
Discharge: HOME | End: 2024-03-11
Attending: EMERGENCY MEDICINE
Payer: COMMERCIAL

## 2024-03-11 VITALS
SYSTOLIC BLOOD PRESSURE: 105 MMHG | TEMPERATURE: 99.1 F | HEIGHT: 71 IN | RESPIRATION RATE: 17 BRPM | OXYGEN SATURATION: 100 % | BODY MASS INDEX: 25.9 KG/M2 | HEART RATE: 68 BPM | DIASTOLIC BLOOD PRESSURE: 65 MMHG | WEIGHT: 185 LBS

## 2024-03-11 DIAGNOSIS — S16.1XXA STRAIN OF NECK MUSCLE, INITIAL ENCOUNTER: Primary | ICD-10-CM

## 2024-03-11 PROCEDURE — 99283 EMERGENCY DEPT VISIT LOW MDM: CPT

## 2024-03-11 PROCEDURE — 6370000000 HC RX 637 (ALT 250 FOR IP)

## 2024-03-11 RX ORDER — IBUPROFEN 600 MG/1
600 TABLET ORAL
Status: COMPLETED | OUTPATIENT
Start: 2024-03-11 | End: 2024-03-11

## 2024-03-11 RX ORDER — ACETAMINOPHEN 325 MG/1
650 TABLET ORAL
Status: COMPLETED | OUTPATIENT
Start: 2024-03-11 | End: 2024-03-11

## 2024-03-11 RX ORDER — METHOCARBAMOL 500 MG/1
500 TABLET, FILM COATED ORAL 3 TIMES DAILY
Qty: 15 TABLET | Refills: 0 | Status: SHIPPED | OUTPATIENT
Start: 2024-03-11 | End: 2024-03-16

## 2024-03-11 RX ADMIN — ACETAMINOPHEN 325MG 650 MG: 325 TABLET ORAL at 19:47

## 2024-03-11 RX ADMIN — IBUPROFEN 600 MG: 600 TABLET, FILM COATED ORAL at 19:47

## 2024-03-11 ASSESSMENT — ENCOUNTER SYMPTOMS
SHORTNESS OF BREATH: 0
ABDOMINAL PAIN: 0
VOMITING: 0
DIARRHEA: 0
CHEST TIGHTNESS: 0
NAUSEA: 0
COUGH: 0
CONSTIPATION: 0

## 2024-03-11 ASSESSMENT — PAIN - FUNCTIONAL ASSESSMENT: PAIN_FUNCTIONAL_ASSESSMENT: 0-10

## 2024-03-11 ASSESSMENT — PAIN SCALES - GENERAL
PAINLEVEL_OUTOF10: 8
PAINLEVEL_OUTOF10: 6

## 2024-03-11 ASSESSMENT — PAIN DESCRIPTION - LOCATION: LOCATION: NECK

## 2024-03-11 NOTE — ED PROVIDER NOTES
EMERGENCY DEPARTMENT HISTORY AND PHYSICAL EXAM    7:28 PM      Date: 3/11/2024  Patient Name: Derrick Coronado    History of Presenting Illness     Chief Complaint   Patient presents with    Neck Pain         History Provided By: the patient.     Additional History (Context): Derrick Coronado is a 49 y.o. male with a history of asthma and diabetes presenting to the ED due to right-sided neck pain.  Patient reports that he notices increasing pain with movement of the neck.  Denies any injury or trauma.  Reports that he has had something like this in the past.  States that he thought it was a sore throat initially so he tried cough drops, but it is not helping.  Denies taking any other medications.  Denies headache, dizziness, lightheadedness, vision changes.      PCP: None, None    Current Facility-Administered Medications   Medication Dose Route Frequency Provider Last Rate Last Admin    ibuprofen (ADVIL;MOTRIN) tablet 600 mg  600 mg Oral NOW Maddie, Radha A, PA-C        acetaminophen (TYLENOL) tablet 650 mg  650 mg Oral NOW Maddie, Radha A, PA-C         Current Outpatient Medications   Medication Sig Dispense Refill    methocarbamol (ROBAXIN) 500 MG tablet Take 1 tablet by mouth 3 times daily for 5 days 15 tablet 0       Past History     Past Medical History:  Past Medical History:   Diagnosis Date    Asthma     Diabetes (HCC)        Past Surgical History:  History reviewed. No pertinent surgical history.    Family History:  History reviewed. No pertinent family history.    Social History:  Social History     Tobacco Use    Smoking status: Never     Passive exposure: Never    Smokeless tobacco: Never   Substance Use Topics    Alcohol use: Yes     Comment: occ    Drug use: No       Allergies:  Allergies   Allergen Reactions    Shellfish Allergy Hives         Review of Systems       Review of Systems   Constitutional:  Negative for chills and fever.   Respiratory:  Negative for cough, chest tightness and shortness of

## 2024-03-11 NOTE — DISCHARGE INSTRUCTIONS
Alternate Tylenol and ibuprofen as needed for pain.  Begin taking Robaxin as needed for muscle spasm.  This medication can make you drowsy so do not take prior to driving or operating heavy machinery.  Place heat on the area to decrease pain and swelling.  Follow-up with PCP within the next few days or to be reevaluated.  Return to the ED with any new or worsening symptoms.

## 2024-03-11 NOTE — ED TRIAGE NOTES
Ambulatory pt c/o R side neck pain that started yesterday while he was at work. Pt reports using chloraseptic with no relief. No changes in ROM. Denies recent injury or trauma. Hx of same, pt reports same pain comes in goes out of nowhere

## 2024-04-07 ENCOUNTER — HOSPITAL ENCOUNTER (EMERGENCY)
Facility: HOSPITAL | Age: 49
Discharge: HOME | End: 2024-04-07
Attending: STUDENT IN AN ORGANIZED HEALTH CARE EDUCATION/TRAINING PROGRAM
Payer: COMMERCIAL

## 2024-04-07 VITALS
BODY MASS INDEX: 25.9 KG/M2 | HEART RATE: 85 BPM | SYSTOLIC BLOOD PRESSURE: 129 MMHG | WEIGHT: 185 LBS | RESPIRATION RATE: 18 BRPM | TEMPERATURE: 98.1 F | HEIGHT: 71 IN | OXYGEN SATURATION: 100 % | DIASTOLIC BLOOD PRESSURE: 90 MMHG

## 2024-04-07 DIAGNOSIS — M54.50 ACUTE MIDLINE LOW BACK PAIN WITHOUT SCIATICA: Primary | ICD-10-CM

## 2024-04-07 PROCEDURE — 6370000000 HC RX 637 (ALT 250 FOR IP): Performed by: STUDENT IN AN ORGANIZED HEALTH CARE EDUCATION/TRAINING PROGRAM

## 2024-04-07 PROCEDURE — 99283 EMERGENCY DEPT VISIT LOW MDM: CPT

## 2024-04-07 RX ORDER — LIDOCAINE 50 MG/G
1 PATCH TOPICAL DAILY
Qty: 30 PATCH | Refills: 0 | Status: SHIPPED | OUTPATIENT
Start: 2024-04-07 | End: 2024-05-07

## 2024-04-07 RX ORDER — LIDOCAINE 4 G/G
1 PATCH TOPICAL
Status: DISCONTINUED | OUTPATIENT
Start: 2024-04-07 | End: 2024-04-07 | Stop reason: HOSPADM

## 2024-04-07 ASSESSMENT — PAIN SCALES - GENERAL: PAINLEVEL_OUTOF10: 6

## 2024-04-07 ASSESSMENT — PAIN - FUNCTIONAL ASSESSMENT: PAIN_FUNCTIONAL_ASSESSMENT: 0-10

## 2024-04-07 NOTE — ED TRIAGE NOTES
Mid lumbar back pain, intermittent and non radiating which began yesterday and is worsening. 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     States lidocaine patches are helpful.

## 2024-04-07 NOTE — ED PROVIDER NOTES
[facility] EMERGENCY DEPT  EMERGENCY DEPARTMENT ENCOUNTER      Pt Name: Derrick Coronado  MRN: [id number]  Birthdate 1975  Date of evaluation: 4/7/2024  Provider: Angela Waters MD    CHIEF COMPLAINT       Chief Complaint   Patient presents with    Lower Back Pain         HISTORY OF PRESENT ILLNESS   (Location/Symptom, Timing/Onset, Context/Setting, Quality, Duration, Modifying Factors, Severity)  Note limiting factors.   Derrick Coronado is a 49 y.o. male who presents to the emergency department for lower back that this episode started yesterday at night.  Denies any specific trauma or injury.  Denies any change in his bowel habits.  Denies any tingling or numbness.  Denies any difficulty ambulating.  Feels similar to past episodes.  Denies any urinary symptoms or fevers.  He states that lidocaine patches have helped in the past.  He has not followed up with a specialist or PCP for this.     Nursing Notes were reviewed.    REVIEW OF SYSTEMS    (2-9 systems for level 4, 10 or more for level 5)     Constitutional: No fever  Respiratory: No SOB  Cardio: No chest pain  GI: No blood in stool  : No hematuria  MSK: Positive for back pain  Skin: No rashes    Except as noted above the remainder of the review of systems was reviewed and negative.       PAST MEDICAL HISTORY     Past Medical History:   Diagnosis Date    Asthma     Diabetes (HCC)          SURGICAL HISTORY     History reviewed. No pertinent surgical history.      CURRENT MEDICATIONS       Previous Medications    No medications on file       ALLERGIES     Shellfish allergy    FAMILY HISTORY     History reviewed. No pertinent family history.       SOCIAL HISTORY       Social History     Socioeconomic History    Marital status: Single     Spouse name: None    Number of children: None    Years of education: None    Highest education level: None   Tobacco Use    Smoking status: Never     Passive exposure: Never    Smokeless tobacco: Never   Substance and Sexual Activity

## 2024-05-14 ENCOUNTER — HOSPITAL ENCOUNTER (EMERGENCY)
Facility: HOSPITAL | Age: 49
Discharge: HOME | End: 2024-05-14
Attending: STUDENT IN AN ORGANIZED HEALTH CARE EDUCATION/TRAINING PROGRAM
Payer: COMMERCIAL

## 2024-05-14 VITALS
SYSTOLIC BLOOD PRESSURE: 128 MMHG | BODY MASS INDEX: 26.6 KG/M2 | DIASTOLIC BLOOD PRESSURE: 77 MMHG | HEART RATE: 74 BPM | WEIGHT: 190 LBS | HEIGHT: 71 IN | OXYGEN SATURATION: 100 % | TEMPERATURE: 98 F | RESPIRATION RATE: 15 BRPM

## 2024-05-14 DIAGNOSIS — S16.1XXA STRAIN OF NECK MUSCLE, INITIAL ENCOUNTER: Primary | ICD-10-CM

## 2024-05-14 PROCEDURE — 99283 EMERGENCY DEPT VISIT LOW MDM: CPT

## 2024-05-14 RX ORDER — METHOCARBAMOL 750 MG/1
750 TABLET, FILM COATED ORAL 4 TIMES DAILY PRN
Qty: 40 TABLET | Refills: 0 | Status: SHIPPED | OUTPATIENT
Start: 2024-05-14

## 2024-05-14 ASSESSMENT — ENCOUNTER SYMPTOMS: BACK PAIN: 0

## 2024-05-14 ASSESSMENT — PAIN SCALES - GENERAL: PAINLEVEL_OUTOF10: 6

## 2024-05-14 ASSESSMENT — PAIN - FUNCTIONAL ASSESSMENT: PAIN_FUNCTIONAL_ASSESSMENT: 0-10

## 2024-05-14 NOTE — ED TRIAGE NOTES
Ambulatory to QA with c/o atraumatic posterior neck pain and spasms since this am, states due to work at [employer] at 1800 and needs note for work this evening.

## 2024-05-14 NOTE — ED PROVIDER NOTES
Disposition: Discharge home    [facility] EMERGENCY DEPT  [address] [facility] [address] [phone]  Go to   As needed, If symptoms worsen    Primary care provider    Schedule an appointment as soon as possible for a visit in 3 days  For follow up       Disclaimer: Sections of this note are dictated using utilizing voice recognition software.  Minor typographical errors may be present. If questions arise, please do not hesitate to contact me or call our department.         Iban Judd MD  05/14/24 9822

## 2024-07-10 ENCOUNTER — APPOINTMENT (OUTPATIENT)
Facility: HOSPITAL | Age: 49
End: 2024-07-10
Payer: COMMERCIAL

## 2024-07-10 ENCOUNTER — HOSPITAL ENCOUNTER (EMERGENCY)
Facility: HOSPITAL | Age: 49
Discharge: HOME | End: 2024-07-10
Attending: STUDENT IN AN ORGANIZED HEALTH CARE EDUCATION/TRAINING PROGRAM
Payer: COMMERCIAL

## 2024-07-10 VITALS
DIASTOLIC BLOOD PRESSURE: 86 MMHG | SYSTOLIC BLOOD PRESSURE: 134 MMHG | BODY MASS INDEX: 26.6 KG/M2 | TEMPERATURE: 97.8 F | HEIGHT: 71 IN | WEIGHT: 190 LBS | OXYGEN SATURATION: 99 % | HEART RATE: 71 BPM | RESPIRATION RATE: 18 BRPM

## 2024-07-10 DIAGNOSIS — S62.353A CLOSED NONDISPLACED FRACTURE OF SHAFT OF THIRD METACARPAL BONE OF LEFT HAND, INITIAL ENCOUNTER: Primary | ICD-10-CM

## 2024-07-10 PROCEDURE — 73110 X-RAY EXAM OF WRIST: CPT

## 2024-07-10 PROCEDURE — 73130 X-RAY EXAM OF HAND: CPT

## 2024-07-10 PROCEDURE — 6370000000 HC RX 637 (ALT 250 FOR IP): Performed by: STUDENT IN AN ORGANIZED HEALTH CARE EDUCATION/TRAINING PROGRAM

## 2024-07-10 PROCEDURE — 99283 EMERGENCY DEPT VISIT LOW MDM: CPT

## 2024-07-10 RX ORDER — OXYCODONE HYDROCHLORIDE AND ACETAMINOPHEN 5; 325 MG/1; MG/1
1 TABLET ORAL
Status: DISCONTINUED | OUTPATIENT
Start: 2024-07-10 | End: 2024-07-10

## 2024-07-10 RX ORDER — OXYCODONE HYDROCHLORIDE AND ACETAMINOPHEN 5; 325 MG/1; MG/1
1 TABLET ORAL EVERY 6 HOURS PRN
Qty: 12 TABLET | Refills: 0 | Status: SHIPPED | OUTPATIENT
Start: 2024-07-10 | End: 2024-07-13

## 2024-07-10 RX ORDER — OXYCODONE HYDROCHLORIDE AND ACETAMINOPHEN 5; 325 MG/1; MG/1
1 TABLET ORAL
Status: COMPLETED | OUTPATIENT
Start: 2024-07-10 | End: 2024-07-10

## 2024-07-10 RX ADMIN — OXYCODONE HYDROCHLORIDE AND ACETAMINOPHEN 1 TABLET: 5; 325 TABLET ORAL at 08:49

## 2024-07-10 ASSESSMENT — PAIN DESCRIPTION - DESCRIPTORS: DESCRIPTORS: ACHING

## 2024-07-10 ASSESSMENT — ENCOUNTER SYMPTOMS
VOMITING: 0
ABDOMINAL PAIN: 0
DIARRHEA: 0
PHOTOPHOBIA: 0
CHEST TIGHTNESS: 0
SHORTNESS OF BREATH: 0
EYE PAIN: 0
NAUSEA: 0

## 2024-07-10 ASSESSMENT — PAIN SCALES - GENERAL
PAINLEVEL_OUTOF10: 10
PAINLEVEL_OUTOF10: 8

## 2024-07-10 ASSESSMENT — PAIN DESCRIPTION - LOCATION: LOCATION: HAND

## 2024-07-10 ASSESSMENT — PAIN DESCRIPTION - ORIENTATION: ORIENTATION: LEFT

## 2024-07-10 ASSESSMENT — PAIN - FUNCTIONAL ASSESSMENT: PAIN_FUNCTIONAL_ASSESSMENT: 0-10
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
## 2024-07-10 NOTE — ED PROVIDER NOTES
seconds.   Neurological:      General: No focal deficit present.      Mental Status: He is alert and oriented to person, place, and time.      Sensory: No sensory deficit.      Motor: No weakness.   Psychiatric:         Mood and Affect: Mood normal.           Diagnostic Study Results     Labs -  No results found for this or any previous visit (from the past 12 hour(s)).    Radiologic Studies -   Non-plain film images such as CT, Ultrasound and MRI are read by the radiologist. Plain radiographic images are visualized and preliminarily interpreted by the emergency physician.    XR WRIST LEFT (MIN 3 VIEWS)   Final Result      Left wrist:   -No clearly acute findings in the left wrist.      Left hand:   -Third metacarpal fracture as above.      Electronically signed by Amadeo Swan      XR HAND LEFT (MIN 3 VIEWS)   Final Result      Left wrist:   -No clearly acute findings in the left wrist.      Left hand:   -Third metacarpal fracture as above.      Electronically signed by Amadeo Swan              Medical Decision Making   I am the first provider for this patient.    I reviewed the vital signs, available nursing notes, past medical history, past surgical history, family history and social history.      Vital Signs-Reviewed the patient's vital signs.    EKG: All EKG's are interpreted by the Emergency Department Physician who either signs or Co-signs this chart in the absence of a cardiologist.               Interpretation per the Radiologist below, if available at the time of this note:    ED Course: Progress Notes, Reevaluation, and Consults:    Provider Notes (Medical Decision Making):       MDM  Number of Diagnoses or Management Options  Closed nondisplaced fracture of shaft of third metacarpal bone of left hand, initial encounter  Diagnosis management comments: X-ray demonstrating a comminuted third metacarpal fracture of the left hand..  Patient placed in a radial gutter splint.  Will discharge with

## 2024-07-10 NOTE — DISCHARGE INSTRUCTIONS
Please make sure to make a follow-up appointment with Dr. Tate the hand surgeon as soon as possible.  Keep your hand in the splint.  Do not get it wet.  Return to the emergency department for any new or worsening symptoms.

## 2024-07-12 ENCOUNTER — OFFICE VISIT (OUTPATIENT)
Age: 49
End: 2024-07-12
Payer: COMMERCIAL

## 2024-07-12 VITALS — BODY MASS INDEX: 26.6 KG/M2 | HEIGHT: 71 IN | WEIGHT: 190 LBS

## 2024-07-12 DIAGNOSIS — S62.323A CLOSED DISPLACED FRACTURE OF SHAFT OF THIRD METACARPAL BONE OF LEFT HAND, INITIAL ENCOUNTER: Primary | ICD-10-CM

## 2024-07-12 PROCEDURE — 99204 OFFICE O/P NEW MOD 45 MIN: CPT | Performed by: ORTHOPAEDIC SURGERY

## 2024-07-12 RX ORDER — DICLOFENAC SODIUM 75 MG/1
75 TABLET, DELAYED RELEASE ORAL 2 TIMES DAILY
Qty: 28 TABLET | Refills: 0 | Status: SHIPPED | OUTPATIENT
Start: 2024-07-12 | End: 2024-07-26

## 2024-07-12 NOTE — PROGRESS NOTES
lower leg: No edema.   Skin:     General: Skin is warm and dry.      Capillary Refill: Capillary refill takes less than 2 seconds.      Findings: No bruising or erythema.   Neurological:      General: No focal deficit present.      Mental Status: He is alert and oriented to person, place, and time.   Psychiatric:         Mood and Affect: Mood normal.         Behavior: Behavior normal.          Left hand: There is edema and tenderness to palpation over the dorsum of the hand.  No rotational deformity identified.  Grossly neurovascularly intact distally.  Palm to pulp distance approximately 4 cm.      Imaging:     External plain films reviewed on 7/12/2024 are significant for third metacarpal fracture comminuted and extra-articular.  There appears to be some shortening but no apparent rotational deformity or angulation noted.      Impression     Diagnosis Orders   1. Closed displaced fracture of shaft of third metacarpal bone of left hand, initial encounter  DME Order for (Specify) as OP    diclofenac (VOLTAREN) 75 MG EC tablet            Plan:     Right TKO brace today to be worn in reverse as a left radial gutter splint.    Diclofenac prescribed today to help with inflammation.    I had a thorough discussion with the patient regarding operative versus nonoperative treatment and informed him that it does not appear that operative intervention would not make his fracture any better and could potentially make it worse.  We also discussed the possible need for osteotomy in the future if it becomes apparent that he does have a rotational deformity not seen today on exam.    Instructed patient on the importance of range of motion exercises a couple times a day.    We will not bill for fracture care today as we are still confirming nonoperative treatment is the right course of action at this time.    Return in 17 days (on 7/29/2024) for Reevaluation and x-rays; and progression of motion.     Plan was reviewed with patient,

## 2024-07-22 ENCOUNTER — CLINICAL DOCUMENTATION (OUTPATIENT)
Age: 49
End: 2024-07-22

## 2024-07-22 NOTE — PROGRESS NOTES
[facility] [employer] faxed BLANK Physician Statement to [facility]. Scanned into chart & placed into form bin.

## 2024-07-29 ENCOUNTER — CLINICAL DOCUMENTATION (OUTPATIENT)
Age: 49
End: 2024-07-29

## 2024-07-29 ENCOUNTER — OFFICE VISIT (OUTPATIENT)
Age: 49
End: 2024-07-29
Payer: COMMERCIAL

## 2024-07-29 VITALS — WEIGHT: 194 LBS | BODY MASS INDEX: 27.16 KG/M2 | HEIGHT: 71 IN

## 2024-07-29 DIAGNOSIS — S62.323D CLOSED DISPLACED FRACTURE OF SHAFT OF THIRD METACARPAL BONE OF LEFT HAND WITH ROUTINE HEALING, SUBSEQUENT ENCOUNTER: Primary | ICD-10-CM

## 2024-07-29 PROCEDURE — 99213 OFFICE O/P EST LOW 20 MIN: CPT

## 2024-07-29 PROCEDURE — 73130 X-RAY EXAM OF HAND: CPT
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
## 2024-07-30 ENCOUNTER — TELEPHONE (OUTPATIENT)
Age: 49
End: 2024-07-30

## 2024-07-31 ENCOUNTER — CLINICAL DOCUMENTATION (OUTPATIENT)
Age: 49
End: 2024-07-31

## 2024-07-31 NOTE — PROGRESS NOTES
Forms completed faxed and scanned into chart on 07/30/24. Patient informed and verbalized understanding.

## 2024-09-09 ENCOUNTER — OFFICE VISIT (OUTPATIENT)
Age: 49
End: 2024-09-09
Payer: COMMERCIAL

## 2024-09-09 VITALS — BODY MASS INDEX: 27.44 KG/M2 | WEIGHT: 196 LBS | HEIGHT: 71 IN

## 2024-09-09 DIAGNOSIS — S62.323D CLOSED DISPLACED FRACTURE OF SHAFT OF THIRD METACARPAL BONE OF LEFT HAND WITH ROUTINE HEALING, SUBSEQUENT ENCOUNTER: Primary | ICD-10-CM

## 2024-09-09 PROCEDURE — 99213 OFFICE O/P EST LOW 20 MIN: CPT

## 2024-09-09 PROCEDURE — 73130 X-RAY EXAM OF HAND: CPT

## 2024-09-11 ENCOUNTER — TELEPHONE (OUTPATIENT)
Age: 49
End: 2024-09-11

## 2024-09-19 ENCOUNTER — HOSPITAL ENCOUNTER (OUTPATIENT)
Facility: HOSPITAL | Age: 49
Setting detail: RECURRING SERIES
Discharge: HOME | End: 2024-09-22
Payer: COMMERCIAL

## 2024-09-19 PROCEDURE — 97165 OT EVAL LOW COMPLEX 30 MIN: CPT

## 2024-09-26 ENCOUNTER — HOSPITAL ENCOUNTER (OUTPATIENT)
Facility: HOSPITAL | Age: 49
Setting detail: RECURRING SERIES
Discharge: HOME | End: 2024-09-29
Payer: COMMERCIAL

## 2024-09-26 PROCEDURE — 97530 THERAPEUTIC ACTIVITIES: CPT

## 2024-09-26 PROCEDURE — 97110 THERAPEUTIC EXERCISES: CPT

## 2024-09-26 PROCEDURE — 97018 PARAFFIN BATH THERAPY: CPT

## 2024-09-26 NOTE — PROGRESS NOTES
OCCUPATIONAL THERAPY - DAILY TREATMENT NOTE    Patient Name: Derrick Coronado    Date: 2024    : 1975  Insurance: Payor: ADRIANO / Plan: [employer] INDIV[employer] FAMILY [employer]ANS / Product Type: *No Product type* /        Ins Auth:     Next PN Due By:  10/17                        Patient  verified Yes     Visit #   Current / Total 2 8   Time   In / Out 1000 1038   Total Treatment Time 38   Pain   In / Out 0- stiff 0   Subjective Functional Status/Changes: I liked that wax. I can feel the difference     TREATMENT AREA =  Left hand pain [M79.642]    OBJECTIVE      Paraffin, details: Left hand      Min Rationale   8 decrease inflammation, decrease pain, and increase tissue extensibility to improve patient's ability to progress to PLOF and address remaining functional goals.     Skin assessment post-treatment:   Intact      Therapeutic Procedures:  Tx Min Billable or 1:1 Min (if diff from Tx Min) Procedure, Rationale, Specifics   22  30724 Therapeutic Exercise (timed):  increase ROM, strength, coordination, and proprioception to grasp (see flow sheet as applicable)    Left:  - hook fist  - lumbricals  - full fist  - flat fist  - recip Opp  - adduction/abduction  - Blocking           8  76012 Therapeutic Activity (timed):  use of dynamic activities replicating functional movements to manipulate small items (see flow sheet as applicable)    Marbles: Translated palm <> digit                                TOTAL 1:1 TREATMENT TIME:  (Total Time - Paraffin/Vaso/Fluido)        30          Billed concurrently with other treatments Patient Education:  Reviewed HEP     Objective Information/Functional Measures/Assessment    - good response to Paraffin   - siffness in PIP's with hook fist            Assessment/Plan:    See POC             []  See Progress Note/Re certification     Patient will continue to benefit from skilled OT services to modify and progress therapeutic interventions, analyze and address functional

## 2024-09-30 ENCOUNTER — OFFICE VISIT (OUTPATIENT)
Age: 49
End: 2024-09-30

## 2024-09-30 VITALS — HEIGHT: 71 IN | WEIGHT: 194 LBS | BODY MASS INDEX: 27.16 KG/M2

## 2024-09-30 DIAGNOSIS — S62.323D CLOSED DISPLACED FRACTURE OF SHAFT OF THIRD METACARPAL BONE OF LEFT HAND WITH ROUTINE HEALING, SUBSEQUENT ENCOUNTER: Primary | ICD-10-CM

## 2024-09-30 PROCEDURE — 99024 POSTOP FOLLOW-UP VISIT: CPT
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
## 2024-10-01 ENCOUNTER — TELEPHONE (OUTPATIENT)
Age: 49
End: 2024-10-01

## 2024-10-01 NOTE — TELEPHONE ENCOUNTER
Pt came into the office. He is wondering if he can get a shorter brace than the one he was previously given. He is supposed to return to work on Sunday, and is hoping to get one before then.     Pt can be reached at [phone]

## 2024-10-03 ENCOUNTER — HOSPITAL ENCOUNTER (OUTPATIENT)
Facility: HOSPITAL | Age: 49
Setting detail: RECURRING SERIES
Discharge: HOME | End: 2024-10-06
Payer: COMMERCIAL

## 2024-10-03 PROCEDURE — 97018 PARAFFIN BATH THERAPY: CPT

## 2024-10-03 PROCEDURE — 97110 THERAPEUTIC EXERCISES: CPT

## 2024-10-03 PROCEDURE — 97140 MANUAL THERAPY 1/> REGIONS: CPT

## 2024-10-03 NOTE — PROGRESS NOTES
OCCUPATIONAL THERAPY - DAILY TREATMENT NOTE    Patient Name: Derrick Coronado    Date: 10/3/2024    : 1975  Insurance: Payor: ADRIANO / Plan: [employer] / Product Type: *No Product type* /        Ins Auth:     Next PN Due By:  10/17                        Patient  verified Yes     Visit #   Current / Total 2 8   Time   In / Out 922 958   Total Treatment Time 36   Pain   In / Out 0 \"Sore\"   Subjective Functional Status/Changes: Achiness every now and then  Pain is intermittent  Pt reports he goes back to work at [employer] on Friday     TREATMENT AREA =  Left hand pain [M79.642]    OBJECTIVE      Paraffin, details: Left hand      Min Rationale   8 decrease inflammation, decrease pain, and increase tissue extensibility to improve patient's ability to progress to PLOF and address remaining functional goals.     Skin assessment post-treatment:   Intact      Therapeutic Procedures:  Tx Min Billable or 1:1 Min (if diff from Tx Min) Procedure, Rationale, Specifics   16  24340 Therapeutic Exercise (timed):  increase ROM, strength, coordination, and proprioception to grasp (see flow sheet as applicable)    Left:  - adduction/abduction with holds  - PROM to all joints at each digit with holds  Intrinsic minus stretch   Forearm stretch      12  56280 Manual Therapy (timed):  decrease pain, increase ROM, increase tissue extensibility, and decrease trigger points to increase mobility The manual therapy interventions were performed at a separate and distinct time from the therapeutic activities interventions . (see flow sheet as applicable)    IASTM to volar digits with education                                TOTAL 1:1 TREATMENT TIME:  (Total Time - Paraffin/Vaso/Fluido)        28          Billed concurrently with other treatments Patient Education:  Reviewed HEP     Objective Information/Functional Measures/Assessment               Assessment/Plan:    See POC             []  See Progress Note/Re

## 2024-10-04 ENCOUNTER — CLINICAL DOCUMENTATION (OUTPATIENT)
Age: 49
End: 2024-10-04

## 2024-10-15 ENCOUNTER — APPOINTMENT (OUTPATIENT)
Facility: HOSPITAL | Age: 49
End: 2024-10-15
Payer: COMMERCIAL

## 2024-10-15 ENCOUNTER — HOSPITAL ENCOUNTER (EMERGENCY)
Facility: HOSPITAL | Age: 49
Discharge: HOME | End: 2024-10-15
Attending: EMERGENCY MEDICINE
Payer: COMMERCIAL

## 2024-10-15 VITALS
HEART RATE: 79 BPM | HEIGHT: 71 IN | TEMPERATURE: 97.9 F | BODY MASS INDEX: 25.9 KG/M2 | WEIGHT: 185 LBS | SYSTOLIC BLOOD PRESSURE: 132 MMHG | RESPIRATION RATE: 18 BRPM | DIASTOLIC BLOOD PRESSURE: 88 MMHG | OXYGEN SATURATION: 100 %

## 2024-10-15 DIAGNOSIS — S62.329S: Primary | ICD-10-CM

## 2024-10-15 DIAGNOSIS — M79.642 LEFT HAND PAIN: ICD-10-CM

## 2024-10-15 PROCEDURE — 99283 EMERGENCY DEPT VISIT LOW MDM: CPT

## 2024-10-15 PROCEDURE — 73130 X-RAY EXAM OF HAND: CPT

## 2024-10-15 PROCEDURE — 6370000000 HC RX 637 (ALT 250 FOR IP): Performed by: EMERGENCY MEDICINE

## 2024-10-15 RX ORDER — MORPHINE SULFATE 15 MG/1
7.5 TABLET ORAL EVERY 8 HOURS PRN
Qty: 8 TABLET | Refills: 0 | Status: SHIPPED | OUTPATIENT
Start: 2024-10-15 | End: 2024-10-20

## 2024-10-15 RX ORDER — IBUPROFEN 400 MG/1
800 TABLET, FILM COATED ORAL ONCE
Status: COMPLETED | OUTPATIENT
Start: 2024-10-15 | End: 2024-10-15

## 2024-10-15 RX ADMIN — IBUPROFEN 800 MG: 400 TABLET, FILM COATED ORAL at 19:54

## 2024-10-15 ASSESSMENT — PAIN DESCRIPTION - ORIENTATION: ORIENTATION: LEFT

## 2024-10-15 ASSESSMENT — PAIN - FUNCTIONAL ASSESSMENT: PAIN_FUNCTIONAL_ASSESSMENT: 0-10

## 2024-10-15 ASSESSMENT — PAIN DESCRIPTION - LOCATION: LOCATION: HAND
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
## 2024-10-16 ENCOUNTER — TELEPHONE (OUTPATIENT)
Age: 49
End: 2024-10-16

## 2024-10-16 NOTE — TELEPHONE ENCOUNTER
Patient came in office today requesting some paperwork to be filled out by Vandana today for his job. I told the patient that there isn't a guarantee that it'll be filled out today, and that I would inform her clinical staff.      Patient can be reached at [phone]

## 2024-10-16 NOTE — DISCHARGE INSTRUCTIONS
Wear wrist brace while at work and at home doing physical activity to continue to promote healing.  Would alternate between ibuprofen 100 mg and Tylenol 1 g 3 times a day for pain relief.  Have prescribed oral morphine for more moderate to severe breakthrough pain after trying these 2 medications first.  Would try to avoid taking this medication while at work as can make you feel groggy or if planning to drive within the next 4 to 6 hours.

## 2024-10-17 ENCOUNTER — HOSPITAL ENCOUNTER (OUTPATIENT)
Facility: HOSPITAL | Age: 49
Setting detail: RECURRING SERIES
Discharge: HOME | End: 2024-10-20
Payer: COMMERCIAL

## 2024-10-17 PROCEDURE — 97140 MANUAL THERAPY 1/> REGIONS: CPT

## 2024-10-17 PROCEDURE — 97110 THERAPEUTIC EXERCISES: CPT

## 2024-10-17 PROCEDURE — 97018 PARAFFIN BATH THERAPY: CPT

## 2024-10-18 ENCOUNTER — CLINICAL DOCUMENTATION (OUTPATIENT)
Age: 49
End: 2024-10-18

## 2024-10-18 NOTE — PROGRESS NOTES
OCCUPATIONAL THERAPY - DAILY TREATMENT NOTE    Patient Name: Derrick Coronado    Date: 10/17/2024    : 1975  Insurance: Payor: ADRIANO / Plan: [employer]ILY [employer] / Product Type: *No Product type* /        Ins Auth:     Next PN Due By:  10/17                        Patient  verified Yes     Visit #   Current / Total 4 8   Time   In / Out 1000 1040   Total Treatment Time 40   Pain   In / Out 0 1.5-2   Subjective Functional Status/Changes: Pt reports he feels joan he is 80% back to normal  He bought a compression glove with a wrist strap and wore that at work     TREATMENT AREA =  Left hand pain [M79.642]    OBJECTIVE      Paraffin, details: Left hand      Min Rationale   8 decrease inflammation, decrease pain, and increase tissue extensibility to improve patient's ability to progress to PLOF and address remaining functional goals.     Skin assessment post-treatment:   Intact      Therapeutic Procedures:  Tx Min Billable or 1:1 Min (if diff from Tx Min) Procedure, Rationale, Specifics   32  57747 Therapeutic Exercise (timed):  increase ROM, strength, coordination, and proprioception to grasp (see flow sheet as applicable)  Recheck for PN  Left:  - PROM to all joints at each digit with holds  Intrinsic minus stretch   EDC glides with marker  Digit hyperextension all digits passive  Digiflex 5#, each digit x10                                       TOTAL 1:1 TREATMENT TIME:  (Total Time - Paraffin/Vaso/Fluido)        32          Billed concurrently with other treatments Patient Education:  Reviewed HEP     Objective Information/Functional Measures/Assessment  See goals below             Assessment/Plan:  Pt has been present for 3 follow up visits with one missed visit d/t getting his days mixed up. Pt compliant with all HEPs, clinic tasks, and use of modalities at home.  Pt with increased TEMPLE of the IF (+5), MF (+7) and SF (+4).  Patient with increased Left  strength (+13) and pt with 
only.      Pt will have 60 pounds of  in the Left hand to allow for functional grasp for all ADL activities including dressing, bathing and self care.  Status at Eval: 31#  Status at PN (10/17/24): 43#, progressing, goal not met.      Patient will increase  Left hand 3pt, lateral, and tip pinch strength by 4 pounds or greater to improve participation in meal preparation and home management tasks.  Status at Eval: 3pt= 11#; lateral= 15#; tip= 7#P!  Status at PN (10/17/24): 3pt= 11#p!; lateral= 15#; tip= 7#P!, goal not met.       Non-Medicare, can change goals, can adjust or add frequency duration, no signature required      New Goals to be achieved in __4__ WEEKS    Patient will show a 10% or more improvement in QuickDASH score, demonstrating improve function to complete ADLs/IADLs.  Status at Eval: 32%  Status at PN (10/17/24): 25%, progressing, goal not met.     2.   Patient will increase Left index, middle, and small digit total active motion to 235 degrees or greater for ease of opening jars.  Status at Eval: IF= 205 deg; MF= 230 deg; SF= 228 deg  Status at PN (10/17/24): IF= 210 (+5); MF= 237(+7), SF= 232 (+4), goal met for MF only.      3.   Pt will have 60 pounds of  in the Left hand to allow for functional grasp for all ADL activities including dressing, bathing and self care.  Status at Eval: 31#  Status at PN (10/17/24): 43#, progressing, goal not met.      4.   Patient will increase  Left hand 3pt, lateral, and tip pinch strength by 4 pounds or greater to improve participation in meal preparation and home management tasks.  Status at Eval: 3pt= 11#; lateral= 15#; tip= 7#P!  Status at PN (10/17/24): 3pt= 11#p!; lateral= 15#; tip= 7#P!, goal not met.     Frequency / Duration:   Patient to be seen   1-2   times per week for   4    WEEKS    RECOMMENDATIONS  Patient would benefit from the continuation of skilled rehab interventions for functional progress to achieving above stated clinically significant

## 2024-10-25 ENCOUNTER — HOSPITAL ENCOUNTER (OUTPATIENT)
Facility: HOSPITAL | Age: 49
Setting detail: RECURRING SERIES
Discharge: HOME | End: 2024-10-28
Payer: COMMERCIAL

## 2024-10-25 PROCEDURE — 97110 THERAPEUTIC EXERCISES: CPT

## 2024-10-25 PROCEDURE — 97760 ORTHOTIC MGMT&TRAING 1ST ENC: CPT

## 2024-10-25 PROCEDURE — 97018 PARAFFIN BATH THERAPY: CPT

## 2024-10-25 NOTE — PROGRESS NOTES
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     Skin assessment post-treatment:   Intact      Therapeutic Procedures:  Tx Min Billable or 1:1 Min (if diff from Tx Min) Procedure, Rationale, Specifics   17  88066 Therapeutic Exercise (timed):  increase ROM, strength, coordination, and proprioception to grasp (see flow sheet as applicable)    Left:  - PROM to all joints at each digit with holds  EDC glides with marker  Digit hyperextension each digit x10 mass x10  Towel scrunches   Soft medium soft putty find     15  18658 Orthotic Management and Training (timed): custom RMO improve positioning of MF to increase patient's ability to complete ADLS/IADLs.     MF in extension                             TOTAL 1:1 TREATMENT TIME:  (Total Time - Paraffin/Vaso/Fluido)        32          Billed concurrently with other treatments Patient Education:  Reviewed HEP     Objective Information/Functional Measures/Assessment    Decreased pain with RMO         Assessment/Plan:    Continue POC         []  See Progress Note/Re certification     Patient will continue to benefit from skilled OT services to modify and progress

## 2024-10-31 ENCOUNTER — HOSPITAL ENCOUNTER (OUTPATIENT)
Facility: HOSPITAL | Age: 49
Setting detail: RECURRING SERIES
Discharge: HOME | End: 2024-11-03
Payer: COMMERCIAL

## 2024-10-31 PROCEDURE — 97110 THERAPEUTIC EXERCISES: CPT

## 2024-10-31 PROCEDURE — 97018 PARAFFIN BATH THERAPY: CPT

## 2024-10-31 NOTE — PROGRESS NOTES
OCCUPATIONAL THERAPY - DAILY TREATMENT NOTE    Patient Name: Derrick Coronado    Date: 10/31/2024    : 1975  Insurance: Payor: ADRIANO / Plan: [employer] INDIV[employer]ILY [employer]ANS / Product Type: *No Product type* /        Ins Auth:  30 visits per year   Next PN Due By:                      Patient  verified Yes     Visit #   Current / Total 2 8   Time   In / Out 920 1000   Total Treatment Time 40   Pain   In / Out 0 0   Subjective Functional Status/Changes: Patient reporting pain moving into thumb      TREATMENT AREA =  Left hand pain [M79.642]    OBJECTIVE      Paraffin, details: Left hand      Min Rationale   8 decrease inflammation, decrease pain, and increase tissue extensibility to improve patient's ability to progress to PLOF and address remaining functional goals.     Skin assessment post-treatment:   Intact      Therapeutic Procedures:  Tx Min Billable or 1:1 Min (if diff from Tx Min) Procedure, Rationale, Specifics   32  60284 Therapeutic Exercise (timed):  increase ROM, strength, coordination, and proprioception to grasp (see flow sheet as applicable)    Left:  - Thumb Abduction/Adduction  - PROM Thumb  - Blocking Thumb   - PROM to all joints at each digit with holds  - EDC glides with marker  - Digit hyperextension each digit x10 mass x10  - Dexterity Balls  - Soft medium soft putty find                                    TOTAL 1:1 TREATMENT TIME:  (Total Time - Paraffin/Vaso/Fluido)        32          Billed concurrently with other treatments Patient Education:  Reviewed HEP     Objective Information/Functional Measures/Assessment    - c/o pain with Relative motion splint- will bring in next session   - decreased discomfort in thumb post TE         Assessment/Plan:    Continue POC         []  See Progress Note/Re certification     Patient will continue to benefit from skilled OT services to modify and progress therapeutic interventions, analyze and address functional mobility deficits,

## 2024-11-07 ENCOUNTER — HOSPITAL ENCOUNTER (OUTPATIENT)
Facility: HOSPITAL | Age: 49
Setting detail: RECURRING SERIES
Discharge: HOME | End: 2024-11-10
Payer: COMMERCIAL

## 2024-11-07 PROCEDURE — 97760 ORTHOTIC MGMT&TRAING 1ST ENC: CPT

## 2024-11-07 PROCEDURE — 97032 APPL MODALITY 1+ESTIM EA 15: CPT

## 2024-11-07 PROCEDURE — 97018 PARAFFIN BATH THERAPY: CPT

## 2024-11-07 PROCEDURE — 97110 THERAPEUTIC EXERCISES: CPT

## 2024-11-07 NOTE — PROGRESS NOTES
OCCUPATIONAL THERAPY - DAILY TREATMENT NOTE    Patient Name: Derrick Coronado    Date: 2024    : 1975  Insurance: Payor: ADRIANO / Plan: [employer] IN[employer] FAMILY [employer]ANS / Product Type: *No Product type* /        Ins Auth:  30 visits per year   Next PN Due By:                      Patient  verified Yes     Visit #   Current / Total 3 8   Time   In / Out 920 1000   Total Treatment Time 40   Pain   In / Out 0 0   Subjective Functional Status/Changes: Pt reporting he brought his splint in for adjustments      TREATMENT AREA =  Left hand pain [M79.642]    OBJECTIVE      Estim Attended (TIMED), cold laser  type/location: IF proximal phalanx volar and dorsal side     Min Rationale   8 decrease pain and increase tissue extensibility to improve patient's ability to progress to PLOF and address remaining functional goals.     Skin assessment post-treatment:   Intact       Paraffin, details: Left hand      Min Rationale   8 decrease inflammation, decrease pain, and increase tissue extensibility to improve patient's ability to progress to PLOF and address remaining functional goals.     Skin assessment post-treatment:   Intact      Therapeutic Procedures:  Tx Min Billable or 1:1 Min (if diff from Tx Min) Procedure, Rationale, Specifics   11  58837 Therapeutic Exercise (timed):  increase ROM, strength, coordination, and proprioception to grasp (see flow sheet as applicable)    Left:  - PROM to all joints at each digit with holds  - 1\" pegs sets of 3 palm>digit x40     5  89795 Manual Therapy (timed):  decrease pain, increase ROM, increase tissue extensibility, and decrease trigger points to increase mobility The manual therapy interventions were performed at a separate and distinct time from the therapeutic activities interventions . (see flow sheet as applicable)    Joint mobs PIP and DIP IF and MF     8  84093 Orthotic Management and Training (timed): adjustment to RMO splint for comfort

## 2024-11-14 ENCOUNTER — HOSPITAL ENCOUNTER (OUTPATIENT)
Facility: HOSPITAL | Age: 49
Setting detail: RECURRING SERIES
Discharge: HOME | End: 2024-11-17
Payer: COMMERCIAL

## 2024-11-14 PROCEDURE — 97032 APPL MODALITY 1+ESTIM EA 15: CPT

## 2024-11-14 PROCEDURE — 97018 PARAFFIN BATH THERAPY: CPT

## 2024-11-14 PROCEDURE — 97110 THERAPEUTIC EXERCISES: CPT

## 2024-11-14 PROCEDURE — 97530 THERAPEUTIC ACTIVITIES: CPT

## 2024-11-14 NOTE — PROGRESS NOTES
ROM    11/14 Index Middle Small                MP 69 79 85   PIP 84 92 83   DIP 40 60 59         TEMPLE 193 231 227   Change          Measurements: Taken with Chad Dynamometer, in Lbs   Level 2 11/14   Right    Left 43        Pinch Measurements: Taken with Pinch Gauge, in Lbs       11/14       Left 3pt 12    Lateral 16    Tip 10                 Assessment/Plan:    Patient seen for 7 visits with one missed appointment. Left hand RF- DIP starting to go into hyperextension.  Able to improve all Left pinches with no pain reported. No functional change with left . Pain decreasing overall. Slight decrease in digit ROM at progress note, possibly due to increased achy ness. Mixed results with use of cold laser, Good response to heat.  Patient encouraged to make follow up appointment with MD.            [x]  See Progress Note/Re certification     Patient will continue to benefit from skilled OT services to modify and progress therapeutic interventions, analyze and address functional mobility deficits, analyze and address ROM deficits, analyze and address strength deficits, analyze and address soft tissue restrictions, analyze and cue for proper movement patterns, and instruct in home and community integration  to attain remaining goals.   If an interpreting service was utilized for treatment of this patient, the contents of this document represent the material reviewed with the patient via the .   Progress toward goals / Updated goals:    Patient will be independent with HEP for left hand ROM/stretches/strengthening to increase ROM and strength for ADL/IADL tasks.   Status at Eval: tendon glide HEP, blocking HEP, putty HEP  Status at PN (10/17/24): goal met.   Status at PN (11/14/2024): Goal Met       Patient will show a 10% or more improvement in QuickDASH score, demonstrating improve function to complete ADLs/IADLs.  Status at Eval: 32%  Status at PN (10/17/24): 25%, progressing, goal not met.   Status at 
 in the Left hand to allow for functional grasp for all ADL activities including dressing, bathing and self care.  Status at Eval: 31#  Status at PN (10/17/24): 43#, progressing, goal not met.   Status at PN (11/14/2024): 43#     Patient will increase  Left hand 3pt, lateral, and tip pinch strength by 4 pounds or greater to improve participation in meal preparation and home management tasks.  Status at Eval: 3pt= 11#; lateral= 15#; tip= 7#P!  Status at PN (10/17/24): 3pt= 11#p!; lateral= 15#; tip= 7#P!, goal not met.    Status at PN (11/14/2024):  3pt: 12   Lat: 16  Tip:10       Frequency / Duration:   Patient to be seen   1-2   times per week for   4    WEEKS    RECOMMENDATIONS  Patient would benefit from the continuation of skilled rehab interventions for functional progress to achieving above stated clinically significant goals.  Continue per initial Plan of Care.    If you have any questions/comments please contact us directly.  Thank you for allowing us to assist in the care of your patient.    CHLOÉ TREJO       11/14/2024       8:09 AM

## 2024-11-21 ENCOUNTER — TELEPHONE (OUTPATIENT)
Age: 49
End: 2024-11-21

## 2024-11-21 NOTE — TELEPHONE ENCOUNTER
This patient was discussed at monthly meeting with OT and discussed having him follow up with Dr. Tate given deficits he was still having. Pt scheduled on PA schedule not Dr. Tate. Please reschedule.

## 2024-11-21 NOTE — TELEPHONE ENCOUNTER
Spoke to patient to reschedule to Dr. Tate's schedule.  Offered 11/25 but patient is unable to do Mondays.  Rescheduled to 12/5 with Dr. Tate.

## 2024-11-27 ENCOUNTER — TELEPHONE (OUTPATIENT)
Facility: HOSPITAL | Age: 49
End: 2024-11-27

## 2024-11-27 NOTE — TELEPHONE ENCOUNTER
spoke with pt about NS to today's appointment & not having any more F/Us. Pt reported he would like to continue OT. Advised to call FO today to make more follow up appts. Asked pt about MV on 11/21/24- pt reported he called day before and RS to today

## 2024-12-05 ENCOUNTER — OFFICE VISIT (OUTPATIENT)
Age: 49
End: 2024-12-05

## 2024-12-05 VITALS — WEIGHT: 178.8 LBS | HEIGHT: 71 IN | BODY MASS INDEX: 25.03 KG/M2

## 2024-12-05 DIAGNOSIS — S62.323D CLOSED DISPLACED FRACTURE OF SHAFT OF THIRD METACARPAL BONE OF LEFT HAND WITH ROUTINE HEALING, SUBSEQUENT ENCOUNTER: Primary | ICD-10-CM

## 2024-12-05 NOTE — PROGRESS NOTES
callus formation about the previously identified comminuted, extra-articular fracture of the third metacarpal.  No interval displacement when compared to prior plain films.    7/29/2024 3 views of the left hand reviewed and significant for a once again seen comminuted, extra-articular fracture of the third metacarpal.  There is no interval displacement when compared to prior plain films.    External plain films reviewed on 7/12/2024 are significant for third metacarpal fracture comminuted and extra-articular.  There appears to be some shortening but no apparent rotational deformity or angulation noted.      Impression     Diagnosis Orders   1. Closed displaced fracture of shaft of third metacarpal bone of left hand with routine healing, subsequent encounter  AMB POC XRAY, HAND; 3+ VIEWS                Plan:     Activities as tolerated.    Return to full duty no restrictions.    Symptomatic care with over-the-counter anti-inflammatories and ice as needed.    Continue home exercises.    Reassurance provided regarding some continued swelling and pain that should improve over time    Return if symptoms worsen or fail to improve.     Plan was reviewed with patient, who verbalized agreement and understanding of the plan    Note: This note was completed using voice recognition software.  Any typographical/name errors or mistakes are unintentional.

## 2024-12-10 ENCOUNTER — CLINICAL DOCUMENTATION (OUTPATIENT)
Age: 49
End: 2024-12-10

## 2024-12-12 ENCOUNTER — HOSPITAL ENCOUNTER (OUTPATIENT)
Facility: HOSPITAL | Age: 49
Setting detail: RECURRING SERIES
Discharge: HOME | End: 2024-12-15
Payer: COMMERCIAL

## 2024-12-12 PROCEDURE — 97110 THERAPEUTIC EXERCISES: CPT

## 2024-12-12 PROCEDURE — 97018 PARAFFIN BATH THERAPY: CPT

## 2024-12-12 PROCEDURE — 97032 APPL MODALITY 1+ESTIM EA 15: CPT

## 2024-12-12 PROCEDURE — 97535 SELF CARE MNGMENT TRAINING: CPT

## 2024-12-12 NOTE — PROGRESS NOTES
JAZMINE RANDHAWA [facility] - [facility]  [address] [place] [id number] - Ph: [phone]    Fx: [phone]  OCCUPATIONAL THERAPY PROGRESS NOTE  [x] Progress Note  [] Discharge Summary  Patient Name: Derrick Coronado : 1975   Treatment/Medical Diagnosis: Left hand pain [M79.642]   Referral Source: Catherine Brown PA-C     Date of Initial Visit: 24 Attended Visits: 9 Missed Visits: 3     Prior Level of Function: shipyard worker,     Comorbidities:  Diabetes mellitus     SUMMARY OF TREATMENT  Pt with gap in care d/t work. Pt with improved pain levels and QuickDASH score at this time. Pt reports still having \"achiness\" in the IF and MF.  Compared to previous PN, with increased IF TEMPLE (+19), decreased MF TEMPLE (-10), and decreased SF TEMPLE (-12). Pt most likely limited by arthritis at this time. Patient with increased Left  strength (+4), increased 3pt pinch strength (+3), increased lateral pinch strength (+3), and maintained tip pinch strength. Pt to be seen for 2 more visits with focus on overall hand strength and then discharged to an [facility].       CURRENT STATUS  Patient will show a 10% or more improvement in QuickDASH score, demonstrating improve function to complete ADLs/IADLs.  Status at Eval: 32%  Status at PN (10/17/24): 25%, progressing, goal not met.   Status at PN (2024): 32%  Status at PN (24): 20%, goal met.       Patient will increase Left index, middle, and small digit total active motion to 235 degrees or greater for ease of opening jars.  Status at Eval: IF= 205 deg; MF= 230 deg; SF= 228 deg  Status at PN (10/17/24): IF= 210 (+5); MF= 237(+7), SF= 232 (+4), goal met for MF only.   Status at PN (2024):  IF: 193   MF: 231   SF: 227  Status at PN (24):  IF= 212; ; SF= 215; goal not met.      Pt will have 60 pounds of  in the Left hand to allow for functional grasp for all ADL activities including 
32              Billed concurrently with other treatments Patient Education:  Reviewed HEP     Objective Information/Functional Measures/Assessment    Quick Dash: 32%      Right  Hand ROM    12/12 Index Middle Small   MP 79 77 80   PIP 88 92 90   DIP 45 52 45         TEMPLE      Change 212 221 215       Measurements: Taken with Chad Dynamometer, in Lbs   Level 2 11/14 12/12   Right     Left 43 47        Pinch Measurements: Taken with Pinch Gauge, in Lbs       11/14 12/12        Left 3pt 12 15    Lateral 16 19    Tip 10 10                 Assessment/Plan:  Pt with gap in care d/t work. Pt with improved pain levels and QuickDASH score at this time. Pt reports still having \"achiness\" in the IF and MF.  Compared to previous PN, with increased IF TEMPLE (+19), decreased MF TEMPLE (-10), and decreased SF TEMPLE (-12). Pt most likely limited by arthritis at this time. Patient with increased Left  strength (+4), increased 3pt pinch strength (+3), increased lateral pinch strength (+3), and maintained tip pinch strength. Pt to be seen for 2 more visits with focus on overall hand strength and then discharged to an [facility].            [x]  See Progress Note/Re certification     Patient will continue to benefit from skilled OT services to modify and progress therapeutic interventions, analyze and address functional mobility deficits, analyze and address ROM deficits, analyze and address strength deficits, analyze and address soft tissue restrictions, analyze and cue for proper movement patterns, and instruct in home and community integration  to attain remaining goals.   If an interpreting service was utilized for treatment of this patient, the contents of this document represent the material reviewed with the patient via the .   Progress toward goals / Updated goals:      Patient will show a 10% or more improvement in QuickDASH score, demonstrating improve function to complete ADLs/IADLs.  Status at Eval: 32%  Status at PN

## 2024-12-19 ENCOUNTER — HOSPITAL ENCOUNTER (OUTPATIENT)
Facility: HOSPITAL | Age: 49
Setting detail: RECURRING SERIES
Discharge: HOME | End: 2024-12-22
Payer: COMMERCIAL

## 2024-12-19 PROCEDURE — 97110 THERAPEUTIC EXERCISES: CPT

## 2024-12-19 PROCEDURE — 97018 PARAFFIN BATH THERAPY: CPT

## 2024-12-19 PROCEDURE — 97032 APPL MODALITY 1+ESTIM EA 15: CPT

## 2024-12-19 NOTE — PROGRESS NOTES
progress therapeutic interventions, analyze and address ROM deficits, analyze and address strength deficits, and instruct in home and community integration  to attain remaining goals.   Progress toward goals / Updated goals:      Patient will increase Left index, middle, and small digit total active motion to 235 degrees or greater for ease of opening jars.  Status at Eval: IF= 205 deg; MF= 230 deg; SF= 228 deg  Status at PN (10/17/24): IF= 210 (+5); MF= 237(+7), SF= 232 (+4), goal met for MF only.   Status at PN (11/14/2024):  IF: 193   MF: 231   SF: 227  Status at PN (12/12/24):  IF= 212; ; SF= 215; goal not met.      Pt will have 60 pounds of  in the Left hand to allow for functional grasp for all ADL activities including dressing, bathing and self care.  Status at Eval: 31#  Status at PN (10/17/24): 43#, progressing, goal not met.   Status at PN (11/14/2024): 43#  Status at PN (12/12/24): 47#, goal not met     Patient will increase  Left hand 3pt, lateral, and tip pinch strength by 4 pounds or greater to improve participation in meal preparation and home management tasks.  Status at Eval: 3pt= 11#; lateral= 15#; tip= 7#P!  Status at PN (10/17/24): 3pt= 11#p!; lateral= 15#; tip= 7#P!, goal not met.    Status at PN (11/14/2024):  3pt: 12   Lat: 16  Tip:10  Status at PN (12/12/24): 3pt= 15#; lateral= 19#; tip= 10#; goal met for 3pt and tip pinch only.      PLAN  [x]  Continue Plan of Care  []  Upgrade activities as tolerated    []  Discharge due to :    []  Other:      Therapist: CHLOÉ TREJO COTA/L    Date: 12/19/2024 Time: 9:22 AM     Future Appointments   Date Time Provider Department Center   12/26/2024 10:00 AM [facility] [address] [id number]

## 2024-12-26 ENCOUNTER — APPOINTMENT (OUTPATIENT)
Facility: HOSPITAL | Age: 49
End: 2024-12-26
Payer: COMMERCIAL

## 2024-12-26 NOTE — ED NOTES
Pt declines COVID and flu test at this time. States he only has slightly itchy throat.      Prakash Saini RN  03/12/23 3425 PAIN

## 2024-12-27 ENCOUNTER — HOSPITAL ENCOUNTER (OUTPATIENT)
Facility: HOSPITAL | Age: 49
Setting detail: RECURRING SERIES
Discharge: HOME | End: 2024-12-30
Payer: COMMERCIAL

## 2024-12-27 PROCEDURE — 97032 APPL MODALITY 1+ESTIM EA 15: CPT

## 2024-12-27 PROCEDURE — 97018 PARAFFIN BATH THERAPY: CPT

## 2024-12-27 PROCEDURE — 97535 SELF CARE MNGMENT TRAINING: CPT

## 2024-12-27 PROCEDURE — 97110 THERAPEUTIC EXERCISES: CPT

## 2024-12-27 NOTE — PROGRESS NOTES
Occupational Therapy Discharge Instructions      [facility]  [address] [place] [address] [phone] fax    Patient: Derrick Coronado  : 1975      Continue Home Exercise Program 1-2 times per day for 4 weeks, then decrease to 3 times per week      Continue with    [x] Ice  as needed 1-2 times per day     [x] Heat           Follow up with MD:     [x] Upon completion of therapy     [] As needed      Recommendations:     [x]   Return to activity with home program    []   Return to activity with the following modifications:       []Post Rehab Program    []Join Independent aquatic program     []Return to/join local gym        Additional Comments: continue keeping those fingers warm as it gets colder here! Follow up with Catherine/Bebeto if needed. Thank you for your participation.       Amarilys Melgar OT 2024 9:46 AM    
limited by arthritis at this time in the IF with beginning Rhonda's node noted. Since IE, pt with increased Left  strength (+34), increased 3pt pinch strength (+5), increased lateral pinch strength (+6), and increased tip pinch strength (+4). Pt has met or progressing towards all goal at this time. Pt with discharge instructions, keila straps, and upgraded putty for home use. Thank you for this referral!      PLAN  [x]Discontinue therapy: [x]Patient has reached or is progressing toward set goals      []Patient is non-compliant or has abdicated      []Due to lack of appreciable progress towards set goals    Amarilys Melgar OT 12/27/2024  8:44 AM    Not Medicaid Ins, no signature required for DC

## 2025-01-19 ENCOUNTER — HOSPITAL ENCOUNTER (EMERGENCY)
Facility: HOSPITAL | Age: 50
Discharge: HOME | End: 2025-01-19
Payer: COMMERCIAL

## 2025-01-19 VITALS
DIASTOLIC BLOOD PRESSURE: 87 MMHG | TEMPERATURE: 97.5 F | WEIGHT: 185 LBS | HEART RATE: 87 BPM | OXYGEN SATURATION: 100 % | RESPIRATION RATE: 18 BRPM | HEIGHT: 70 IN | SYSTOLIC BLOOD PRESSURE: 127 MMHG | BODY MASS INDEX: 26.48 KG/M2

## 2025-01-19 DIAGNOSIS — M79.645 FINGER PAIN, LEFT: Primary | ICD-10-CM

## 2025-01-19 DIAGNOSIS — S62.323A CLOSED DISPLACED FRACTURE OF SHAFT OF THIRD METACARPAL BONE OF LEFT HAND, INITIAL ENCOUNTER: ICD-10-CM

## 2025-01-19 PROCEDURE — 6360000002 HC RX W HCPCS

## 2025-01-19 PROCEDURE — 96372 THER/PROPH/DIAG INJ SC/IM: CPT

## 2025-01-19 PROCEDURE — 99284 EMERGENCY DEPT VISIT MOD MDM: CPT

## 2025-01-19 RX ORDER — LIDOCAINE 30 MG/G
CREAM TOPICAL
Qty: 28.5 G | Refills: 0 | Status: SHIPPED | OUTPATIENT
Start: 2025-01-19

## 2025-01-19 RX ORDER — KETOROLAC TROMETHAMINE 15 MG/ML
15 INJECTION, SOLUTION INTRAMUSCULAR; INTRAVENOUS
Status: COMPLETED | OUTPATIENT
Start: 2025-01-19 | End: 2025-01-19

## 2025-01-19 RX ORDER — ACETAMINOPHEN 500 MG
500 TABLET ORAL 4 TIMES DAILY PRN
Qty: 30 TABLET | Refills: 0 | Status: SHIPPED | OUTPATIENT
Start: 2025-01-19

## 2025-01-19 RX ADMIN — KETOROLAC TROMETHAMINE 15 MG: 15 INJECTION, SOLUTION INTRAMUSCULAR; INTRAVENOUS at 18:27

## 2025-01-19 ASSESSMENT — PAIN - FUNCTIONAL ASSESSMENT: PAIN_FUNCTIONAL_ASSESSMENT: 0-10

## 2025-01-19 ASSESSMENT — PAIN SCALES - GENERAL: PAINLEVEL_OUTOF10: 6

## 2025-01-19 NOTE — DISCHARGE INSTRUCTIONS
Please follow-up with primary care doctor, and as far as the dark finger stripe , please follow-up with dermatology, return to the ED immediately worsening department symptoms

## 2025-01-19 NOTE — ED PROVIDER NOTES
EMERGENCY DEPARTMENT HISTORY AND PHYSICAL EXAM      Patient Name: Derrick Coronado  MRN: [id number]  YOB: 1975  Provider: Amarilys Holman PA-C  PCP: None, None   Time/Date of evaluation: 6:18 PM EST on 1/19/25    History of Presenting Illness     Chief Complaint   Patient presents with    Finger Pain       History Provided By: Patient     History (Narative):   Derrick Coronado is a 49 y.o. male with a PMHX of asthma, diet  who presents to the emergency department  by POV C/O of finger pain.  Patient endorses that in July he sustained a fracture to his third left finger.  He states that he has been dealing with chronic pain and has been diagnosed with arthritis.  Patient endorsed that he recently finished physical therapy.  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  He believes that working in the cold weather have caused him to have a arthritis flare.  He denies taking any over-the-counter medications however he states that he has been trying to do his physical therapy exercises with minimal relief.  He denies any fever, chills, history of IV drug use, recent injections, or instrumentation any falls or injuries.    Past Medical History:  Past Medical History:   Diagnosis Date    Asthma     Diabetes (HCC)        Past Surgical History:  History reviewed. No pertinent surgical history.    Family History:  History reviewed. No pertinent family history.    Social History:  Social History     Tobacco Use    Smoking status: Never     Passive exposure: Never    Smokeless tobacco: Never   Substance Use Topics    Alcohol use: Yes     Comment: occ    Drug use: No       Medications:  Current Facility-Administered Medications   Medication Dose Route Frequency Provider Last Rate Last Admin    ketorolac (TORADOL) injection 15 mg  15 mg IntraMUSCular NOW Amarilys Holman PA-C         Current Outpatient Medications   Medication Sig Dispense Refill

## 2025-01-19 NOTE — ED TRIAGE NOTES
Original injury to left third finger, fracture, July 2024. Has completed physical therapy last December. C/o intermittent pain to area, is wondering if it is arthritis flare up.

## 2025-03-02 ENCOUNTER — HOSPITAL ENCOUNTER (EMERGENCY)
Facility: HOSPITAL | Age: 50
Discharge: HOME | End: 2025-03-02
Payer: COMMERCIAL

## 2025-03-02 VITALS
WEIGHT: 185 LBS | DIASTOLIC BLOOD PRESSURE: 95 MMHG | TEMPERATURE: 98.1 F | SYSTOLIC BLOOD PRESSURE: 154 MMHG | HEIGHT: 71 IN | RESPIRATION RATE: 18 BRPM | BODY MASS INDEX: 25.9 KG/M2 | OXYGEN SATURATION: 100 % | HEART RATE: 78 BPM

## 2025-03-02 DIAGNOSIS — M79.645 FINGER PAIN, LEFT: Primary | ICD-10-CM

## 2025-03-02 PROCEDURE — 99283 EMERGENCY DEPT VISIT LOW MDM: CPT

## 2025-03-02 PROCEDURE — 96372 THER/PROPH/DIAG INJ SC/IM: CPT

## 2025-03-02 PROCEDURE — 6360000002 HC RX W HCPCS

## 2025-03-02 RX ORDER — KETOROLAC TROMETHAMINE 15 MG/ML
15 INJECTION, SOLUTION INTRAMUSCULAR; INTRAVENOUS
Status: COMPLETED | OUTPATIENT
Start: 2025-03-02 | End: 2025-03-02

## 2025-03-02 RX ORDER — HYDROCODONE BITARTRATE AND ACETAMINOPHEN 5; 325 MG/1; MG/1
1 TABLET ORAL EVERY 6 HOURS PRN
Qty: 10 TABLET | Refills: 0 | Status: SHIPPED | OUTPATIENT
Start: 2025-03-02 | End: 2025-03-05

## 2025-03-02 RX ORDER — IBUPROFEN 800 MG/1
800 TABLET, FILM COATED ORAL EVERY 8 HOURS PRN
Qty: 30 TABLET | Refills: 0 | Status: SHIPPED | OUTPATIENT
Start: 2025-03-02

## 2025-03-02 RX ADMIN — KETOROLAC TROMETHAMINE 15 MG: 15 INJECTION, SOLUTION INTRAMUSCULAR; INTRAVENOUS at 16:23

## 2025-03-02 ASSESSMENT — PAIN DESCRIPTION - ORIENTATION: ORIENTATION: LEFT

## 2025-03-02 ASSESSMENT — PAIN SCALES - GENERAL: PAINLEVEL_OUTOF10: 6

## 2025-03-02 ASSESSMENT — PAIN - FUNCTIONAL ASSESSMENT: PAIN_FUNCTIONAL_ASSESSMENT: 0-10

## 2025-03-02 ASSESSMENT — PAIN DESCRIPTION - DESCRIPTORS: DESCRIPTORS: THROBBING

## 2025-03-02 ASSESSMENT — PAIN DESCRIPTION - LOCATION: LOCATION: FINGER (COMMENT WHICH ONE)
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
## 2025-03-02 NOTE — DISCHARGE INSTRUCTIONS
Call Ortho and PCP for f/u.  Do not drive or operate heavy machinery while taking Norco.  Norco for pain 7-10. Ibuprofen for pain 1-6.  Return to ED for new or worsening/ concerning symptoms.

## 2025-05-16 ENCOUNTER — HOSPITAL ENCOUNTER (EMERGENCY)
Age: 50
Discharge: HOME | End: 2025-05-16
Attending: EMERGENCY MEDICINE
Payer: COMMERCIAL

## 2025-05-16 ENCOUNTER — APPOINTMENT (OUTPATIENT)
Age: 50
End: 2025-05-16
Payer: COMMERCIAL

## 2025-05-16 VITALS
HEIGHT: 71 IN | HEART RATE: 88 BPM | BODY MASS INDEX: 25.9 KG/M2 | SYSTOLIC BLOOD PRESSURE: 131 MMHG | WEIGHT: 185 LBS | OXYGEN SATURATION: 99 % | RESPIRATION RATE: 16 BRPM | TEMPERATURE: 98.6 F | DIASTOLIC BLOOD PRESSURE: 98 MMHG

## 2025-05-16 DIAGNOSIS — J40 BRONCHITIS: Primary | ICD-10-CM

## 2025-05-16 DIAGNOSIS — J20.9 ACUTE BRONCHITIS, UNSPECIFIED ORGANISM: ICD-10-CM

## 2025-05-16 LAB
FLUAV RNA SPEC QL NAA+PROBE: NOT DETECTED
FLUBV RNA SPEC QL NAA+PROBE: NOT DETECTED
SARS-COV-2 RNA RESP QL NAA+PROBE: NOT DETECTED
SOURCE: NORMAL

## 2025-05-16 PROCEDURE — 87636 SARSCOV2 & INF A&B AMP PRB: CPT

## 2025-05-16 PROCEDURE — 6370000000 HC RX 637 (ALT 250 FOR IP): Performed by: EMERGENCY MEDICINE

## 2025-05-16 PROCEDURE — 99284 EMERGENCY DEPT VISIT MOD MDM: CPT

## 2025-05-16 PROCEDURE — 71046 X-RAY EXAM CHEST 2 VIEWS: CPT

## 2025-05-16 RX ORDER — GUAIFENESIN 600 MG/1
600 TABLET, EXTENDED RELEASE ORAL 2 TIMES DAILY
Qty: 30 TABLET | Refills: 0 | Status: SHIPPED | OUTPATIENT
Start: 2025-05-16 | End: 2025-05-31

## 2025-05-16 RX ORDER — AZITHROMYCIN 250 MG/1
TABLET, FILM COATED ORAL
Qty: 1 PACKET | Refills: 0 | Status: SHIPPED | OUTPATIENT
Start: 2025-05-16 | End: 2025-05-20

## 2025-05-16 RX ORDER — AZITHROMYCIN 250 MG/1
500 TABLET, FILM COATED ORAL
Status: COMPLETED | OUTPATIENT
Start: 2025-05-16 | End: 2025-05-16

## 2025-05-16 RX ADMIN — AZITHROMYCIN DIHYDRATE 500 MG: 250 TABLET ORAL at 23:12

## 2025-05-16 ASSESSMENT — ENCOUNTER SYMPTOMS
SHORTNESS OF BREATH: 0
COUGH: 1
ABDOMINAL PAIN: 0
SORE THROAT: 0

## 2025-05-16 ASSESSMENT — PAIN - FUNCTIONAL ASSESSMENT: PAIN_FUNCTIONAL_ASSESSMENT: NONE - DENIES PAIN

## 2025-05-16 NOTE — ED PROVIDER NOTES
[facility] EMERGENCY DEPARTMENT  eMERGENCY dEPARTMENT eNCOUnter      Pt Name: Derrick Coronado  MRN: [id number]  Birthdate 1975 of evaluation: 5/16/2025  Provider:Palomo Verduzco MD    CHIEF COMPLAINT       HPI    Derrick Coronado is a 50 y.o. male  c/o  Pt arrived to ED with cough for almost a week. He had chills and fever on Tuesday. He denies  pain with swallowing. Patient also  states his cough was dry at the beginning of the week and now it has become roductive.     ROS    Review of Systems   Constitutional:  Positive for activity change, appetite change, fatigue and fever.   HENT:  Negative for congestion and sore throat.    Respiratory:  Positive for cough ((+) productive). Negative for shortness of breath.    Cardiovascular: Negative.  Negative for palpitations.   Gastrointestinal:  Negative for abdominal pain.   Genitourinary:  Negative for dysuria.   Musculoskeletal: Negative.    Neurological: Negative.    Psychiatric/Behavioral: Negative.     All other systems reviewed and are negative.      Except as noted above the remainder of the review of systems was reviewed and negative.       PAST MEDICAL HISTORY     Past Medical History:   Diagnosis Date    Asthma     Diabetes (HCC)          SURGICAL HISTORY     No past surgical history on file.      CURRENTMEDICATIONS       Previous Medications    ACETAMINOPHEN (TYLENOL) 500 MG TABLET    Take 1 tablet by mouth 4 times daily as needed for Pain    DICLOFENAC (VOLTAREN) 75 MG EC TABLET    Take 1 tablet by mouth 2 times daily for 14 days    IBUPROFEN (ADVIL;MOTRIN) 800 MG TABLET    Take 1 tablet by mouth every 8 hours as needed for Pain    LIDOCAINE 3 % CREA    Apply a pea-sized amount daily the finger as needed    METHOCARBAMOL (ROBAXIN-750) 750 MG TABLET    Take 1 tablet by mouth 4 times daily as needed (Muscle spasms)       ALLERGIES     Shellfish allergy    FAMILY HISTORY     No family history on file.       SOCIAL HISTORY       Social

## 2025-05-16 NOTE — ED TRIAGE NOTES
Pt arrived to ED with cough for almost a week. Had chills and fever on Tuesday. No pain with swallowing. Pt states the cough was dry at the beginning of the week and now it has become productive.